# Patient Record
Sex: FEMALE | Race: WHITE | Employment: OTHER | URBAN - METROPOLITAN AREA
[De-identification: names, ages, dates, MRNs, and addresses within clinical notes are randomized per-mention and may not be internally consistent; named-entity substitution may affect disease eponyms.]

---

## 2017-03-27 ENCOUNTER — HOSPITAL ENCOUNTER (OUTPATIENT)
Dept: MAMMOGRAPHY | Age: 72
Discharge: HOME | End: 2017-03-27
Attending: FAMILY MEDICINE
Payer: MEDICARE

## 2017-03-27 DIAGNOSIS — Z12.31 VISIT FOR SCREENING MAMMOGRAM: ICD-10-CM

## 2017-03-27 PROCEDURE — 77067 SCR MAMMO BI INCL CAD: CPT

## 2018-04-25 ENCOUNTER — HOSPITAL ENCOUNTER (OUTPATIENT)
Dept: MAMMOGRAPHY | Age: 73
Discharge: HOME | End: 2018-04-25
Attending: FAMILY MEDICINE
Payer: MEDICARE

## 2018-04-25 DIAGNOSIS — Z12.39 SCREENING BREAST EXAMINATION: ICD-10-CM

## 2018-04-25 PROCEDURE — 77067 SCR MAMMO BI INCL CAD: CPT

## 2019-05-15 ENCOUNTER — HOSPITAL ENCOUNTER (OUTPATIENT)
Dept: MAMMOGRAPHY | Age: 74
Discharge: HOME | End: 2019-05-15
Attending: FAMILY MEDICINE
Payer: MEDICARE

## 2019-05-15 DIAGNOSIS — Z12.39 BREAST SCREENING, UNSPECIFIED: ICD-10-CM

## 2019-05-15 PROCEDURE — 77067 SCR MAMMO BI INCL CAD: CPT

## 2019-06-19 ENCOUNTER — HOSPITAL ENCOUNTER (OUTPATIENT)
Dept: MRI IMAGING | Age: 74
Discharge: HOME | End: 2019-06-19
Attending: PAIN MEDICINE
Payer: MEDICARE

## 2019-06-19 DIAGNOSIS — M48.062 PSEUDOCLAUDICATION SYNDROME: ICD-10-CM

## 2019-06-19 PROCEDURE — 72148 MRI LUMBAR SPINE W/O DYE: CPT

## 2019-07-30 ENCOUNTER — HOSPITAL ENCOUNTER (OUTPATIENT)
Dept: MAMMOGRAPHY | Age: 74
Discharge: HOME | End: 2019-07-30
Attending: ORTHOPAEDIC SURGERY
Payer: MEDICARE

## 2019-07-30 DIAGNOSIS — M81.8 DISUSE OSTEOPOROSIS WITHOUT PATHOLOGICAL FRACTURE: ICD-10-CM

## 2019-07-30 PROCEDURE — 77080 DXA BONE DENSITY AXIAL: CPT

## 2019-09-23 ENCOUNTER — HOSPITAL ENCOUNTER (OUTPATIENT)
Dept: PREADMISSION TESTING | Age: 74
Discharge: HOME | End: 2019-09-23
Attending: ORTHOPAEDIC SURGERY
Payer: MEDICARE

## 2019-09-23 ENCOUNTER — HOSPITAL ENCOUNTER (OUTPATIENT)
Dept: GENERAL RADIOLOGY | Age: 74
Discharge: HOME | End: 2019-09-23
Attending: ORTHOPAEDIC SURGERY
Payer: MEDICARE

## 2019-09-23 VITALS
TEMPERATURE: 98.3 F | HEART RATE: 79 BPM | DIASTOLIC BLOOD PRESSURE: 73 MMHG | SYSTOLIC BLOOD PRESSURE: 134 MMHG | WEIGHT: 199.08 LBS | RESPIRATION RATE: 20 BRPM | HEIGHT: 61 IN | BODY MASS INDEX: 37.59 KG/M2 | OXYGEN SATURATION: 100 %

## 2019-09-23 LAB
25(OH)D3 SERPL-MCNC: 24 NG/ML (ref 30–100)
ABO + RH BLD: NORMAL
ALBUMIN SERPL-MCNC: 3.9 G/DL (ref 3.5–5)
ALBUMIN/GLOB SERPL: 1.1 {RATIO} (ref 1.1–2.2)
ALP SERPL-CCNC: 84 U/L (ref 45–117)
ALT SERPL-CCNC: 22 U/L (ref 12–78)
ANION GAP SERPL CALC-SCNC: 4 MMOL/L (ref 5–15)
APPEARANCE UR: CLEAR
AST SERPL-CCNC: 14 U/L (ref 15–37)
BACTERIA URNS QL MICRO: NEGATIVE /HPF
BILIRUB SERPL-MCNC: 0.4 MG/DL (ref 0.2–1)
BILIRUB UR QL: NEGATIVE
BLOOD GROUP ANTIBODIES SERPL: NORMAL
BUN SERPL-MCNC: 22 MG/DL (ref 6–20)
BUN/CREAT SERPL: 24 (ref 12–20)
CALCIUM SERPL-MCNC: 9.4 MG/DL (ref 8.5–10.1)
CHLORIDE SERPL-SCNC: 105 MMOL/L (ref 97–108)
CO2 SERPL-SCNC: 28 MMOL/L (ref 21–32)
COLOR UR: ABNORMAL
CREAT SERPL-MCNC: 0.93 MG/DL (ref 0.55–1.02)
EPITH CASTS URNS QL MICRO: ABNORMAL /LPF
ERYTHROCYTE [DISTWIDTH] IN BLOOD BY AUTOMATED COUNT: 17.4 % (ref 11.5–14.5)
EST. AVERAGE GLUCOSE BLD GHB EST-MCNC: 148 MG/DL
GLOBULIN SER CALC-MCNC: 3.6 G/DL (ref 2–4)
GLUCOSE SERPL-MCNC: 78 MG/DL (ref 65–100)
GLUCOSE UR STRIP.AUTO-MCNC: NEGATIVE MG/DL
HBA1C MFR BLD: 6.8 % (ref 4.2–6.3)
HCT VFR BLD AUTO: 35.7 % (ref 35–47)
HGB BLD-MCNC: 11.5 G/DL (ref 11.5–16)
HGB UR QL STRIP: NEGATIVE
HYALINE CASTS URNS QL MICRO: ABNORMAL /LPF (ref 0–5)
INR PPP: 1 (ref 0.9–1.1)
KETONES UR QL STRIP.AUTO: NEGATIVE MG/DL
LEUKOCYTE ESTERASE UR QL STRIP.AUTO: ABNORMAL
MCH RBC QN AUTO: 26.1 PG (ref 26–34)
MCHC RBC AUTO-ENTMCNC: 32.2 G/DL (ref 30–36.5)
MCV RBC AUTO: 81 FL (ref 80–99)
NITRITE UR QL STRIP.AUTO: NEGATIVE
NRBC # BLD: 0 K/UL (ref 0–0.01)
NRBC BLD-RTO: 0 PER 100 WBC
PH UR STRIP: 7 [PH] (ref 5–8)
PLATELET # BLD AUTO: 425 K/UL (ref 150–400)
PMV BLD AUTO: 9.2 FL (ref 8.9–12.9)
POTASSIUM SERPL-SCNC: 3.8 MMOL/L (ref 3.5–5.1)
PREALB SERPL-MCNC: 18.2 MG/DL (ref 20–40)
PROT SERPL-MCNC: 7.5 G/DL (ref 6.4–8.2)
PROT UR STRIP-MCNC: NEGATIVE MG/DL
PROTHROMBIN TIME: 10.5 SEC (ref 9–11.1)
RBC # BLD AUTO: 4.41 M/UL (ref 3.8–5.2)
RBC #/AREA URNS HPF: ABNORMAL /HPF (ref 0–5)
SODIUM SERPL-SCNC: 137 MMOL/L (ref 136–145)
SP GR UR REFRACTOMETRY: 1.02 (ref 1–1.03)
SPECIMEN EXP DATE BLD: NORMAL
UA: UC IF INDICATED,UAUC: ABNORMAL
UROBILINOGEN UR QL STRIP.AUTO: 1 EU/DL (ref 0.2–1)
WBC # BLD AUTO: 7.6 K/UL (ref 3.6–11)
WBC URNS QL MICRO: ABNORMAL /HPF (ref 0–4)

## 2019-09-23 PROCEDURE — 97161 PT EVAL LOW COMPLEX 20 MIN: CPT

## 2019-09-23 PROCEDURE — 83036 HEMOGLOBIN GLYCOSYLATED A1C: CPT

## 2019-09-23 PROCEDURE — 85027 COMPLETE CBC AUTOMATED: CPT

## 2019-09-23 PROCEDURE — 81001 URINALYSIS AUTO W/SCOPE: CPT

## 2019-09-23 PROCEDURE — 85610 PROTHROMBIN TIME: CPT

## 2019-09-23 PROCEDURE — 36415 COLL VENOUS BLD VENIPUNCTURE: CPT

## 2019-09-23 PROCEDURE — 80053 COMPREHEN METABOLIC PANEL: CPT

## 2019-09-23 PROCEDURE — 86900 BLOOD TYPING SEROLOGIC ABO: CPT

## 2019-09-23 PROCEDURE — 97116 GAIT TRAINING THERAPY: CPT

## 2019-09-23 PROCEDURE — 84134 ASSAY OF PREALBUMIN: CPT

## 2019-09-23 PROCEDURE — 82306 VITAMIN D 25 HYDROXY: CPT

## 2019-09-23 PROCEDURE — 71046 X-RAY EXAM CHEST 2 VIEWS: CPT

## 2019-09-23 RX ORDER — ACETAMINOPHEN 10 MG/ML
1000 INJECTION, SOLUTION INTRAVENOUS ONCE
Status: CANCELLED | OUTPATIENT
Start: 2019-09-30 | End: 2019-09-30

## 2019-09-23 RX ORDER — ATORVASTATIN CALCIUM 20 MG/1
20 TABLET, FILM COATED ORAL DAILY
COMMUNITY

## 2019-09-23 RX ORDER — GABAPENTIN 300 MG/1
300 CAPSULE ORAL 3 TIMES DAILY
COMMUNITY
End: 2021-11-08

## 2019-09-23 RX ORDER — METFORMIN HYDROCHLORIDE 500 MG/1
TABLET ORAL 2 TIMES DAILY WITH MEALS
COMMUNITY

## 2019-09-23 RX ORDER — CEFAZOLIN SODIUM/WATER 2 G/20 ML
2 SYRINGE (ML) INTRAVENOUS ONCE
Status: CANCELLED | OUTPATIENT
Start: 2019-09-30 | End: 2019-09-30

## 2019-09-23 RX ORDER — LISINOPRIL AND HYDROCHLOROTHIAZIDE 10; 12.5 MG/1; MG/1
1 TABLET ORAL DAILY
COMMUNITY

## 2019-09-23 RX ORDER — OMEPRAZOLE 20 MG/1
20 TABLET, DELAYED RELEASE ORAL AS NEEDED
COMMUNITY

## 2019-09-23 RX ORDER — PREGABALIN 150 MG/1
150 CAPSULE ORAL ONCE
Status: CANCELLED | OUTPATIENT
Start: 2019-09-30 | End: 2019-09-30

## 2019-09-23 RX ORDER — CITALOPRAM 40 MG/1
40 TABLET, FILM COATED ORAL EVERY EVENING
COMMUNITY

## 2019-09-23 NOTE — PROGRESS NOTES
[facility]  Physical Therapy Pre-surgery evaluation  [address]  [place], [address] [facility]    physical Therapy pre SPINE surgery EVALUATION  Patient:Maliha KAMARA [place] (74 y.o. 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 Discussed anticipated disposition to home with possible discharge within a 1 to 2 day time frame post-surgery. Patient in agreement, partner not present for visit today but will be able to assist after surgery. Patient has not been issued a back brace. Anticipate patient will need acute PT and OT orders based on expected functional deficits following two stage surgery. GOALS: (Goals have been discussed and agreed upon with patient.)  DISCHARGE GOALS: Time Frame: 1 DAY  1. Patient will demonstrate increased strength, range of motion, and pain control via a home exercise program in order to minimize functional deficits in preparation for their upcoming surgery. This will be achieved by using education, demonstration and through the use of an informational handout including a home exercise program.  REHABILITATION POTENTIAL FOR STATED GOALS: Good     RECOMMENDATIONS AND PLANNED INTERVENTIONS: (Benefits and precautions of physical therapy have been discussed with the patient.)  1. Home Exercise Program  TREATMENT PLAN EFFECTIVE DATES: 9/23/2019 to 9/23/2019   FREQUENCY/DURATION: Patient to continue to perform home exercise program at least twice daily until surgery. SUBJECTIVE:   Patient stated my back has been bad for a long time.     OBJECTIVE DATA SUMMARY:   HISTORY:      Past Medical History:   Diagnosis Date    Chronic pain     back/steroid injections    Diabetes ([address]     GERD (gastroesophageal reflux disease)     hiatal hernia    Hypertension      Past Surgical History:   Procedure Laterality Date    HX HEENT  1995    left side Meniere's--balance nerve    HX ORTHOPAEDIC      thumb procedure (2 procedures)-biateral     HX ORTHOPAEDIC      right middle finger-hardware    HX OTHER SURGICAL      EGD/Colonoscopy (polyps)    HX NÉSTOR AND BSO      HX TONSILLECTOMY         Prior Level of Function/Home Situation: Patient is indep with ADLS and driving, uses cane in community, no recent falls. Patient lives with her partner who does work full time but will be able to take days off after surgery to assist.   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 Home Situation  Home Environment: Private residence  # Steps to Enter: 3  Rails to Enter: Yes  Hand Rails : Bilateral  One/Two Story Residence: Two story  # of Interior Steps: 15  Interior Rails: Both  Living Alone: No  Support Systems: Spouse/Significant Other/Partner  Patient Expects to be Discharged to[de-identified] Private residence  Current DME Used/Available at Home: Cane, straight  Tub or Shower Type: Tub/Shower combination           EXAMINATION/PRESENTATION/DECISION MAKING:     ADLs (Current Functional Status):    Bathing/Showering:   [x] Independent  [] Requires Assistance from Someone  [] Sponge Bath Only   Ambulation:  [x] Independent  [] Walk Indoors Only  [] Walk Outdoors  [x] Use Assistive Device  [] Use Wheelchair Only     Dressing:  [x] Independent    Requires Assistance from Someone for:  [] Sock/Shoes  [] Pants  [] Everything   Household Activities:  [] Routine house and yard work  [x] Light Housework Only  [] None       Critical Behavior:                Strength:     Strength: Generally decreased, functional                       Tone & Sensation:   Tone: Normal              Sensation: Impaired                  Range Of Motion:     AROM: Within functional limits           PROM: Within functional limits                Coordination:   Coordination: Within functional limits    Functional Mobility:  Transfers:  Sit to Stand: Independent  Stand to Sit: Independent                       Balance:   Sitting: Intact  Standing: Impaired  Standing - Static: Good  Standing - Dynamic : Fair  Ambulation/Gait Training:  Distance (ft): 150 Feet (ft)  Assistive Device: Cane, straight  Ambulation - Level of Assistance: Independent        Gait Abnormalities: Antalgic, Trunk sway increased        Base of Support: Widened        Step Length: Left shortened, Right shortened                      Functional Measure:  10 Meter walk test:  (Specify if any supplemental oxygen is used, the type, pre, during and post sats.)    Self-Selected Or Fast-Velocity: Self Selected Velocity  Trial 1 (Time to Walk 10 Meters): 20.6 Seconds  Trial 2 (Time to Walk 10 Meters): 19.3 Seconds  Trial 3 (Time to Walk 10 Meters): 16.6 Seconds  Average : 18.8 Seconds  Score (Meters/Second): 0.5 Meters/Second             Walking Speed (m/s)  Modifier Scale Age 52-63 Age 61-76 Age 66-77 Age 80-80    Male Female Male Female Male Female Male Female   CH   0% Impaired ? 1.39 ? 1.40 ? 1.36 ? 1.30 ? 1.33 ? 1.27 ? 1.21 ? 1.15   CI   1-19% Impaired 1.11-1.38 1.12-1.39 1.09-1.35 1.04-1.29 1.06-1.32 1.01-1.26 0.96-1.20 0.92-1.14   CJ   20-39% Impaired 0.83-1.10 0.84-1.11 0.82-1.08 0.78-1.03 0.80-1.05 0.76-1.00 0.72-0.95 0.69-0.91   CK   40-59% Impaired 0.56-0.82 0.57-0.83 0.54-0.81 0.52-0.77 0.53-0.79 0.51-0.75 0.48-0.71 0.46-0.68   CL   60-79% Impaired 0.28-0.55 0.28-0.56 0.27-0.53 0.26-0.51 0.27-0.52 0.25-0.50 0.24-0.49 0.23-0.45   CM   80-99% Impaired 0.01-0.28 < 0.01-0.28 < 0.01-0.27 < 0.01-0.26 0.01-0.27 0.01-0.24 0.01-0.23 0.01-0.22   CN   100% Impaired Cannot Perform   Minimal Detectable Change (MDC-90) = 0.1 m/s  Lata CONTRERAS  \"Comfortable and maximum walking speed of adults aged 20-79 years: reference values and determinants. \" Age and Agin Volume 26(1):15-9. Kenyatta Barajas. \"Age- and gender-related test performance in community-dwelling elderly people: Six-Minute Walk Test, Calzada Balance Scale, Timed Up & Go Test, and gait speeds. \" Physical Therapy: 2002 Volume 82(2):128-37. Keegan Pham DM, Supa PW, Lore Garcia JD, Jossue PALM. \"Assessing stability and change of four performance measures: a longitudinal study evaluating outcome following total hip and knee arthroplasty. \" [facility] Musculoskeletal Disorders: 2005 Volume 6(3). Mirlea Valadez, PhD; Jayden Mak, PhD. Elvira Refugio Paper: \"Walking Speed: the Sixth Vital Sign\" Journal of Geriatric Physical Therapy: 2009 - Volume 32 - Issue 2 - p 2-5 . Pain:  Pain Scale 1: Numeric (0 - 10)  Pain Intensity 1: 1  Pain Location 1: Back     Pain Description 1: Aching       Radicular pain:   Patient pain radiating into right LE to level of knee, also has paresthesia and numbness    Activity Tolerance:   Good, no SOB or fatigue noted but ambulation limited to short community distances due to pain. Patient []   does  [x]   does not demonstrate signs/symptoms of shortness of breath/dyspnea on exertion/respiratory distress. COMMUNICATION/EDUCATION:   The patient was educated on:  [x]         Early post operative mobility is imperative to achieve a patient's desired outcomes and to restore biological function. [x]         Post operative spinal precautions may/may not be applicable. These precautions are based on the patient's physician and the procedure(s) performed.     [x]         Spinal precautions including:  ·   No bending forward, sideways, or backwards  ·   No twisting   ·   No lifting more than 5-10 pounds  ·   No sitting longer than 30-60 minutes at a time  ·   Efren brace when out of bed and mobilizing    The patients plan of care was discussed as follows:   [x]         The patient verbalized understanding of his/her plan in preparation for their upcoming surgery  []         The patient's  was present for this session  []        The patient reports that he/she does not have a  identified at this time  []         The  verbalized understanding of the education regarding the patient's upcoming surgery  [x]         Patient/family agree to work toward stated goals and plan of care. []         Patient understands intent and goals of therapy, but is neutral about his/her participation. []         Patient is unable to participate in goal setting and plan of care.       Thank you for this referral.  Alton Joyce, PT    Time Calculation: 30 mins

## 2019-09-23 NOTE — PERIOP NOTES
Preventing Infections Before and After - Your Surgery    IMPORTANT INSTRUCTIONS    Please read and follow these instructions carefully. If you are unable to comply with the below instructions your procedure will be cancelled. Every Night for Three (3) nights before your surgery:  1. Shower with an antibacterial soap, such as Dial, or the soap provided at your preassessment appointment. A shower is better than a bath for cleaning your skin. 2. If needed, ask someone to help you reach all areas of your body. Dont forget to clean your belly button with every shower. The night before your surgery: If you lose your Hibiclens please contact surgery center or you can purchase it at a local pharmacy  1. On the night before your surgery, shower with an antibacterial soap, such as Dial, or the soap provided at your preassessment appointment. 2. With one packet of Hibiclens in hand, turn water off.  3. Apply Hibiclens antiseptic skin cleanser with a clean, freshly washed washcloth. ? Gently apply to your body from chin to toes (except the genital area) and especially the area(s) where your incision(s) will be. ? Leave Hibiclens on your skin for at least 20 seconds. CAUTION: If needed, Hibiclens may be used to clean the folds of skin of the legs (such as in the area of the groin) and on your buttocks and hips. However, do not use Hibiclens above the neck or in the genital area (your bottom) or put inside any area of your body. 4. Turn the water back on and rinse. 5. Dry gently with a clean, freshly washed towel. 6. After your shower, do not use any powder, deodorant, perfumes or lotion. 7. Use clean, freshly washed towels and washcloths every time you shower. 8. Wear clean, freshly washed pajamas to bed the night before surgery. 9. Sleep on clean, freshly washed sheets. 10. Do not allow pets to sleep in your bed with you. The Morning of your surgery:  1.  Shower again thoroughly with an antibacterial soap, such as Dial or the soap provided at your preassessment appointment. If needed, ask someone for help to reach all areas of your body. Dont forget to clean your belly button! Rinse. 2. Dry gently with a clean, freshly washed towel. 3. After your shower, do not use any powder, deodorant, perfumes or lotion prior to surgery. 4. Put on clean, freshly washed clothing. Tips to help prevent infections after your surgery:  1. Protect your surgical wound from germs:  ? Hand washing is the most important thing you and your caregivers can do to prevent infections. ? Keep your bandage clean and dry! ? Do not touch your surgical wound. 2. Use clean, freshly washed towels and washcloths every time you shower; do not share bath linens with others. 3. Until your surgical wound is healed, wear clothing and sleep on bed linens each day that are clean and freshly washed. 4. Do not allow pets to sleep in your bed with you or touch your surgical wound. 5. Do not smoke - smoking delays wound healing. This may be a good time to stop smoking. 6. If you have diabetes, it is important for you to manage your blood sugar levels properly before your surgery as well as after your surgery. Poorly managed blood sugar levels slow down wound healing and prevent you from healing completely. If you lose your Hibiclens, please call the [facility], or it is available for purchase at your pharmacy.                ___________________      ___________________      ________________  (Signature of Patient)          (Witness)                   (Date and Time)

## 2019-09-23 NOTE — PERIOP NOTES
Incentive Spirometer        Using the incentive spirometer helps expand the small air sacs of your lungs, helps you breathe deeply, and helps improve your lung function. Use your incentive spirometer twice a day (10 breaths each time) prior to surgery. How to Use Your Incentive Spirometer:  1. Hold the incentive spirometer in an upright position. 2. Breathe out as usual.   3. Place the mouthpiece in your mouth and seal your lips tightly around it. 4. Take a deep breath. Breathe in slowly and as deeply as possible. Keep the blue flow rate guide between the arrows. 5. Hold your breath as long as possible. Then exhale slowly and allow the piston to fall to the bottom of the column. 6. Rest for a few seconds and repeat steps one through five at least 10 times. 2  PAT Tidal Volume__________1750________  x________________  Date__09/23/19_____09/23/19________________    BRING THE INCENTIVE SPIROMETER WITH YOU TO THE HOSPITAL ON THE DAY OF YOUR SURGERY. Opportunity given to ask and answer questions as well as to observe return demonstration.     Patient signature_____________________________    Witness____________________________

## 2019-09-23 NOTE — PERIOP NOTES
[facility]  Joint/Spine Preoperative Instructions        Surgery Date 09/30/19         Time of Arrival 2:30PM  Contact # [phone] cell    1. On the day of your surgery, please report to the Surgical Services Registration Desk and sign in at your designated time. The Surgery Center is located to the right of the Emergency Room. 2. You must have someone with you to drive you home. You should not drive a car for 24 hours following surgery. Please make arrangements for a friend or family member to stay with you for the first 24 hours after your surgery. 3. No food after midnight 09/29/19. Medications morning of surgery should be taken with a sip of water. Please follow pre-surgery drink instructions that were given at your Pre Admission Testing appointment. 4. We recommend you do not drink any alcoholic beverages for 24 hours before and after your surgery. 5. Contact your surgeons office for instructions on the following medications: non-steroidal anti-inflammatory drugs (i.e. Advil, Aleve), vitamins, and supplements. (Some surgeons will want you to stop these medications prior to surgery and others may allow you to take them)  **If you are currently taking Plavix, Coumadin, Aspirin and/or other blood-thinning agents, contact your surgeon for instructions. ** Your surgeon will partner with the physician prescribing these medications to determine if it is safe to stop or if you need to continue taking. Please do not stop taking these medications without instructions from your surgeon    6. Wear comfortable clothes. Wear glasses instead of contacts. Do not bring any money or jewelry. Please bring picture ID, insurance card, and any prearranged co-payment or hospital payment. Do not wear make-up, particularly mascara the morning of your surgery. Do not wear nail polish, particularly if you are having foot /hand surgery.   Wear your hair loose or down, no ponytails, buns, radha pins or clips. All body piercings must be removed. Please shower with antibacterial soap for three consecutive days before and on the morning of surgery, but do not apply any lotions, powders or deodorants after the shower on the day of surgery. Please use a fresh towels after each shower. Please sleep in clean clothes and change bed linens the night before surgery. Please do not shave for 48 hours prior to surgery. Shaving of the face is acceptable. 7. You should understand that if you do not follow these instructions your surgery may be cancelled. If your physical condition changes (I.e. fever, cold or flu) please contact your surgeon as soon as possible. 8. It is important that you be on time. If a situation occurs where you may be late, please call [phone] (OR Holding Area). 9. If you have any questions and or problems, please call [phone] (Pre-admission Testing). 10. Your surgery time may be subject to change. You will receive a phone call the evening prior if your time changes. 11.  If having outpatient surgery, you must have someone to drive you here, stay with you during the duration of your stay, and to drive you home at time of discharge. 12.   In an effort to improve the efficiency, privacy, and safety for all of our Pre-op patients visitors are not allowed in the Holding area. Once you arrive and are registered your family/visitors will be asked to remain in the waiting room. The Pre-op staff will get you from the Surgical Waiting Area and will explain to you and your family/visitors that the Pre-op phase is beginning. The staff will answer any questions and provide instructions for tracking of the patient, by use of the existing tracking number and color-coded status board in the waiting room.   At this time the staff will also ask for your designated spokesperson information in the event that the physician or staff need to provide an update or obtain any pertinent information. The designated spokesperson will be notified if the physician needs to speak to family during the pre-operative phase. If at any time your family/visitors has questions or concerns they may approach the volunteer desk in the waiting area for assistance. Special Instructions:Practice with incentive spirometry/please bring incentive spirometry back to the hospital for use  Please bring Spine/Back book back to the hospital morning of surgery  Drink Pre-Surgery drinks provided with pre-op apppointment  MEDICATIONS TO TAKE THE MORNING OF SURGERY WITH A SIP OF WATER:  gabapentin,prilosec as needed    I understand a pre-operative phone call will be made to verify my surgery time. In the event that I am not available, I give permission for a message to be left on my answering service and/or with another person?   yes          ___________________      __________   _________    (Signature of Patient)             (Witness)                (Date and Time)

## 2019-09-23 NOTE — PERIOP NOTES
Orthopedic and Spine Patients: Instructions on When You Can    Eat or Drink Before Surgery    You were given 2 pre-surgery drinks at your pre-admission testing appointment.   Night before surgery:    o Drink one pre-surgery bottle at bedtime. o  No Food after midnight!   Day of Surgery:    o  Drink the 2nd  pre-surgery bottle 1 hour before you get to the hospital.        For questions call Pre-Admission Testing at [phone]. They are available from 8:00 am-5:00 pm, Monday through Friday.

## 2019-09-24 LAB
BACTERIA SPEC CULT: NORMAL
BACTERIA SPEC CULT: NORMAL
SERVICE CMNT-IMP: NORMAL

## 2019-09-26 NOTE — ADVANCED PRACTICE NURSE
PAT Nurse Practitioner   Pre-Operative Chart Review/Assessment:-ORTHOPEDIC/NEUROSURGICAL SPINE                Patient Name:  Sheila Elias                                                         Age:   74 y.o.    :  1945     Today's Date:  2019     Date of PAT:   19      Date of Surgery:    19 and 10/1/19     Procedure(s):   L2-L4 lateral fusion 19 and L2-L4 Posterior Decompression and fusion 10/1/19     Surgeon:   Heydi Zavala Clearance:  Dr. Raj Huddleston:      1)  Cardiac Clearance:  Not requested       2)  Diabetic Treatment Consult:  Not indicated-A1C 6.8       3)  Sleep Apnea evaluation:   Not indicated-WAN 3      4) Treatment for MRSA/Staph Aureus:  Negative       5) Additional Concerns:  Staged procedure. Chronic pain, T2DM, GERD, BMI 37                Vital Signs:         Vitals:    19 1322   BP: 134/73   Pulse: 79   Resp: 20   Temp: 98.3 °F (36.8 °C)   SpO2: 100%   Weight: 90.3 kg (199 lb 1.2 oz)   Height: 5' 1\" (1.549 m)            ____________________________________________  PAST MEDICAL HISTORY  Past Medical History:   Diagnosis Date    Chronic pain     back/steroid injections    Diabetes ([address]     GERD (gastroesophageal reflux disease)     hiatal hernia    Hypertension       ____________________________________________  PAST SURGICAL HISTORY  Past Surgical History:   Procedure Laterality Date    HX HEENT      left side Meniere's--balance nerve    HX ORTHOPAEDIC      thumb procedure (2 procedures)-biateral     HX ORTHOPAEDIC      right middle finger-hardware    HX OTHER SURGICAL      EGD/Colonoscopy (polyps)    HX NÉSTOR AND BSO      HX TONSILLECTOMY        ____________________________________________  HOME MEDICATIONS    Current Outpatient Medications   Medication Sig    metFORMIN (GLUCOPHAGE) 500 mg tablet Take  by mouth two (2) times daily (with meals).  citalopram (CELEXA) 40 mg tablet Take 40 mg by mouth every evening.     lisinopril-hydroCHLOROthiazide (PRINZIDE, ZESTORETIC) 10-12.5 mg per tablet Take 1 Tab by mouth daily.  gabapentin (NEURONTIN) 300 mg capsule Take 300 mg by mouth three (3) times daily.  atorvastatin (LIPITOR) 20 mg tablet Take 20 mg by mouth daily.  omeprazole (PRILOSEC OTC) 20 mg tablet Take 20 mg by mouth as needed. No current facility-administered medications for this encounter.       ____________________________________________  ALLERGIES  No Known Allergies   ____________________________________________  SOCIAL HISTORY  Social History     Tobacco Use    Smoking status: Never Smoker    Smokeless tobacco: Never Used   Substance Use Topics    Alcohol use: Never     Frequency: Never      ____________________________________________        Labs:     Results for Shelbi Bauer (MRN [id number]) as of 9/26/2019 12:26   Ref.  Range 9/23/2019 13:32   WBC Latest Ref Range: 3.6 - 11.0 K/uL 7.6   NRBC Latest Ref Range: 0  WBC 0.0   RBC Latest Ref Range: 3.80 - 5.20 M/uL 4.41   HGB Latest Ref Range: 11.5 - 16.0 g/dL 11.5   HCT Latest Ref Range: 35.0 - 47.0 % 35.7   MCV Latest Ref Range: 80.0 - 99.0 FL 81.0   MCH Latest Ref Range: 26.0 - 34.0 PG 26.1   MCHC Latest Ref Range: 30.0 - 36.5 g/dL 32.2   RDW Latest Ref Range: 11.5 - 14.5 % 17.4 (H)   PLATELET Latest Ref Range: 150 - 400 K/uL 425 (H)   MPV Latest Ref Range: 8.9 - 12.9 FL 9.2   ABSOLUTE NRBC Latest Ref Range: 0.00 - 0.01 K/uL 0.00   Color Latest Units:   YELLOW/STRAW   Appearance Latest Ref Range: CLEAR   CLEAR   Specific gravity Latest Ref Range: 1.003 - 1.030   1.020   pH (UA) Latest Ref Range: 5.0 - 8.0   7.0   Protein Latest Ref Range: NEG mg/dL NEGATIVE   Glucose Latest Ref Range: NEG mg/dL NEGATIVE   Ketone Latest Ref Range: NEG mg/dL NEGATIVE   Blood Latest Ref Range: NEG   NEGATIVE   Bilirubin Latest Ref Range: NEG   NEGATIVE   Urobilinogen Latest Ref Range: 0.2 - 1.0 EU/dL 1.0   Nitrites Latest Ref Range: NEG   NEGATIVE   Leukocyte Esterase Latest Ref Range: NEG   SMALL (A)   Epithelial cells Latest Ref Range: FEW /lpf FEW   WBC Latest Ref Range: 0 - 4 /hpf 0-4   RBC Latest Ref Range: 0 - 5 /hpf 0-5   Bacteria Latest Ref Range: NEG /hpf NEGATIVE   Hyaline cast Latest Ref Range: 0 - 5 /lpf 0-2   INR Latest Ref Range: 0.9 - 1.1   1.0   Prothrombin time Latest Ref Range: 9.0 - 11.1 sec 10.5   Sodium Latest Ref Range: 136 - 145 mmol/L 137   Potassium Latest Ref Range: 3.5 - 5.1 mmol/L 3.8   Chloride Latest Ref Range: 97 - 108 mmol/L 105   CO2 Latest Ref Range: 21 - 32 mmol/L 28   Anion gap Latest Ref Range: 5 - 15 mmol/L 4 (L)   Glucose Latest Ref Range: 65 - 100 mg/dL 78   BUN Latest Ref Range: 6 - 20 MG/DL 22 (H)   Creatinine Latest Ref Range: 0.55 - 1.02 MG/DL 0.93   BUN/Creatinine ratio Latest Ref Range: 12 - 20   24 (H)   Calcium Latest Ref Range: 8.5 - 10.1 MG/DL 9.4   GFR est non-AA Latest Ref Range: >60 ml/min/1.73m2 59 (L)   GFR est AA Latest Ref Range: >60 ml/min/1.73m2 >60   Bilirubin, total Latest Ref Range: 0.2 - 1.0 MG/DL 0.4   Protein, total Latest Ref Range: 6.4 - 8.2 g/dL 7.5   Albumin Latest Ref Range: 3.5 - 5.0 g/dL 3.9   Globulin Latest Ref Range: 2.0 - 4.0 g/dL 3.6   A-G Ratio Latest Ref Range: 1.1 - 2.2   1.1   ALT (SGPT) Latest Ref Range: 12 - 78 U/L 22   AST Latest Ref Range: 15 - 37 U/L 14 (L)   Alk. phosphatase Latest Ref Range: 45 - 117 U/L 84   Hemoglobin A1c, (calculated) Latest Ref Range: 4.2 - 6.3 % 6.8 (H)   Est. average glucose Latest Units: mg/dL 148   Prealbumin Latest Ref Range: 20.0 - 40.0 mg/dL 18.2 (L)   CULTURE, MRSA Unknown Rpt   Crossmatch Expiration Unknown 10/03/2019   TYPE & SCREEN Unknown Rpt       Skin:     Denies open wounds, cuts, sores, rashes or other areas of concern in PAT assessment.        Tova Sher NP     PC to pt regarding low prealbumin level and recommendations per Dr. Liriano Poster to drink [facility] Breakfast with every meal. Also advised pt to start Vitamin D 2000 units daily for low Vitamin D level. Pt states she will comply with instructions. PTA medlist updated.

## 2019-09-27 NOTE — PERIOP NOTES
Preop call made and patient given TOA. Patient instructed to drink presurgery drink one hour prior to time of arrival,  then NPO. Voice Message left.

## 2019-09-27 NOTE — PERIOP NOTES
Breanna Mccullough in Dr Jaylan Rizvi office and informed her that PCP notes states preop appointment but does not states she is cleared for surgery. Requested a lina back.

## 2019-09-30 ENCOUNTER — APPOINTMENT (OUTPATIENT)
Dept: GENERAL RADIOLOGY | Age: 74
DRG: 455 | End: 2019-09-30
Attending: ORTHOPAEDIC SURGERY
Payer: MEDICARE

## 2019-09-30 ENCOUNTER — APPOINTMENT (OUTPATIENT)
Dept: CT IMAGING | Age: 74
DRG: 455 | End: 2019-09-30
Attending: ORTHOPAEDIC SURGERY
Payer: MEDICARE

## 2019-09-30 ENCOUNTER — ANESTHESIA EVENT (OUTPATIENT)
Dept: SURGERY | Age: 74
DRG: 455 | End: 2019-09-30
Payer: MEDICARE

## 2019-09-30 ENCOUNTER — ANESTHESIA (OUTPATIENT)
Dept: SURGERY | Age: 74
DRG: 455 | End: 2019-09-30
Payer: MEDICARE

## 2019-09-30 ENCOUNTER — HOSPITAL ENCOUNTER (INPATIENT)
Age: 74
LOS: 3 days | Discharge: HOME HEALTH | DRG: 455 | End: 2019-10-03
Attending: ORTHOPAEDIC SURGERY | Admitting: ORTHOPAEDIC SURGERY
Payer: MEDICARE

## 2019-09-30 DIAGNOSIS — Z98.1 S/P LUMBAR SPINAL FUSION: Primary | ICD-10-CM

## 2019-09-30 PROBLEM — M48.062 SPINAL STENOSIS OF LUMBAR REGION WITH NEUROGENIC CLAUDICATION: Status: ACTIVE | Noted: 2019-09-30

## 2019-09-30 LAB
GLUCOSE BLD STRIP.AUTO-MCNC: 165 MG/DL (ref 65–100)
GLUCOSE BLD STRIP.AUTO-MCNC: 167 MG/DL (ref 65–100)
GLUCOSE BLD STRIP.AUTO-MCNC: 257 MG/DL (ref 65–100)
SERVICE CMNT-IMP: ABNORMAL

## 2019-09-30 PROCEDURE — 77030020704 HC DISECT ENDOSC BLNT J&J -B: Performed by: ORTHOPAEDIC SURGERY

## 2019-09-30 PROCEDURE — C1713 ANCHOR/SCREW BN/BN,TIS/BN: HCPCS | Performed by: ORTHOPAEDIC SURGERY

## 2019-09-30 PROCEDURE — 77030018846 HC SOL IRR STRL H20 ICUM -A: Performed by: ORTHOPAEDIC SURGERY

## 2019-09-30 PROCEDURE — 82962 GLUCOSE BLOOD TEST: CPT

## 2019-09-30 PROCEDURE — 77030039267 HC ADH SKN EXOFIN S2SG -B: Performed by: ORTHOPAEDIC SURGERY

## 2019-09-30 PROCEDURE — 77030033138 HC SUT PGA STRATFX J&J -B: Performed by: ORTHOPAEDIC SURGERY

## 2019-09-30 PROCEDURE — 74011250636 HC RX REV CODE- 250/636: Performed by: ORTHOPAEDIC SURGERY

## 2019-09-30 PROCEDURE — 77030003028 HC SUT VCRL J&J -A: Performed by: ORTHOPAEDIC SURGERY

## 2019-09-30 PROCEDURE — 77030025906 HC GRFT BN CUBE CNFRM MUSC -F: Performed by: ORTHOPAEDIC SURGERY

## 2019-09-30 PROCEDURE — 74011250636 HC RX REV CODE- 250/636: Performed by: ANESTHESIOLOGY

## 2019-09-30 PROCEDURE — 77030008462 HC STPLR SKN PROX J&J -A: Performed by: ORTHOPAEDIC SURGERY

## 2019-09-30 PROCEDURE — 0ST20ZZ RESECTION OF LUMBAR VERTEBRAL DISC, OPEN APPROACH: ICD-10-PCS | Performed by: ORTHOPAEDIC SURGERY

## 2019-09-30 PROCEDURE — 74011250636 HC RX REV CODE- 250/636: Performed by: NURSE ANESTHETIST, CERTIFIED REGISTERED

## 2019-09-30 PROCEDURE — 74011000250 HC RX REV CODE- 250: Performed by: NURSE ANESTHETIST, CERTIFIED REGISTERED

## 2019-09-30 PROCEDURE — 77030020782 HC GWN BAIR PAWS FLX 3M -B

## 2019-09-30 PROCEDURE — 77030005513 HC CATH URETH FOL11 MDII -B: Performed by: ORTHOPAEDIC SURGERY

## 2019-09-30 PROCEDURE — 65270000029 HC RM PRIVATE

## 2019-09-30 PROCEDURE — 0SG10AJ FUSION OF 2 OR MORE LUMBAR VERTEBRAL JOINTS WITH INTERBODY FUSION DEVICE, POSTERIOR APPROACH, ANTERIOR COLUMN, OPEN APPROACH: ICD-10-PCS | Performed by: ORTHOPAEDIC SURGERY

## 2019-09-30 PROCEDURE — 74011250636 HC RX REV CODE- 250/636

## 2019-09-30 PROCEDURE — 77030026438 HC STYL ET INTUB CARD -A: Performed by: NURSE ANESTHETIST, CERTIFIED REGISTERED

## 2019-09-30 PROCEDURE — 77030029099 HC BN WAX SSPC -A: Performed by: ORTHOPAEDIC SURGERY

## 2019-09-30 PROCEDURE — 72100 X-RAY EXAM L-S SPINE 2/3 VWS: CPT

## 2019-09-30 PROCEDURE — 77030013079 HC BLNKT BAIR HGGR 3M -A: Performed by: NURSE ANESTHETIST, CERTIFIED REGISTERED

## 2019-09-30 PROCEDURE — 76210000016 HC OR PH I REC 1 TO 1.5 HR: Performed by: ORTHOPAEDIC SURGERY

## 2019-09-30 PROCEDURE — 77030036948 HC GRFT BN FBR CORT 3DEMIN 5.0CC BACT -F: Performed by: ORTHOPAEDIC SURGERY

## 2019-09-30 PROCEDURE — 77030040356 HC CORD BPLR FRCP COVD -A: Performed by: ORTHOPAEDIC SURGERY

## 2019-09-30 PROCEDURE — 77030035129: Performed by: ORTHOPAEDIC SURGERY

## 2019-09-30 PROCEDURE — 76000 FLUOROSCOPY <1 HR PHYS/QHP: CPT

## 2019-09-30 PROCEDURE — 76060000035 HC ANESTHESIA 2 TO 2.5 HR: Performed by: ORTHOPAEDIC SURGERY

## 2019-09-30 PROCEDURE — 77030019908 HC STETH ESOPH SIMS -A: Performed by: NURSE ANESTHETIST, CERTIFIED REGISTERED

## 2019-09-30 PROCEDURE — 74011250637 HC RX REV CODE- 250/637: Performed by: ORTHOPAEDIC SURGERY

## 2019-09-30 PROCEDURE — 07DR0ZZ EXTRACTION OF ILIAC BONE MARROW, OPEN APPROACH: ICD-10-PCS | Performed by: ORTHOPAEDIC SURGERY

## 2019-09-30 PROCEDURE — 77030020268 HC MISC GENERAL SUPPLY: Performed by: ORTHOPAEDIC SURGERY

## 2019-09-30 PROCEDURE — 72131 CT LUMBAR SPINE W/O DYE: CPT

## 2019-09-30 PROCEDURE — 77030018836 HC SOL IRR NACL ICUM -A: Performed by: ORTHOPAEDIC SURGERY

## 2019-09-30 PROCEDURE — 77030008684 HC TU ET CUF COVD -B: Performed by: NURSE ANESTHETIST, CERTIFIED REGISTERED

## 2019-09-30 PROCEDURE — 76010000171 HC OR TIME 2 TO 2.5 HR INTENSV-TIER 1: Performed by: ORTHOPAEDIC SURGERY

## 2019-09-30 PROCEDURE — 00NY0ZZ RELEASE LUMBAR SPINAL CORD, OPEN APPROACH: ICD-10-PCS | Performed by: ORTHOPAEDIC SURGERY

## 2019-09-30 PROCEDURE — 77030003029 HC SUT VCRL J&J -B: Performed by: ORTHOPAEDIC SURGERY

## 2019-09-30 PROCEDURE — 77030014647 HC SEAL FBRN TISSL BAXT -D: Performed by: ORTHOPAEDIC SURGERY

## 2019-09-30 PROCEDURE — 0SG1071 FUSION OF 2 OR MORE LUMBAR VERTEBRAL JOINTS WITH AUTOLOGOUS TISSUE SUBSTITUTE, POSTERIOR APPROACH, POSTERIOR COLUMN, OPEN APPROACH: ICD-10-PCS | Performed by: ORTHOPAEDIC SURGERY

## 2019-09-30 PROCEDURE — 77030038844 HC GRFT DMB NEVOS BIOE -G1: Performed by: ORTHOPAEDIC SURGERY

## 2019-09-30 PROCEDURE — 77030034475 HC MISC IMPL SPN: Performed by: ORTHOPAEDIC SURGERY

## 2019-09-30 PROCEDURE — 77030014007 HC SPNG HEMSTAT J&J -B: Performed by: ORTHOPAEDIC SURGERY

## 2019-09-30 PROCEDURE — 77030029251 HC SPCR SPN GLMB -K1: Performed by: ORTHOPAEDIC SURGERY

## 2019-09-30 PROCEDURE — 77030040361 HC SLV COMPR DVT MDII -B: Performed by: ORTHOPAEDIC SURGERY

## 2019-09-30 DEVICE — IMPLANTABLE DEVICE: Type: IMPLANTABLE DEVICE | Site: SPINE LUMBAR | Status: FUNCTIONAL

## 2019-09-30 DEVICE — PLYMOUTH THORACOLUMBAR PLATE, L4-L5, 19MM
Type: IMPLANTABLE DEVICE | Site: SPINE LUMBAR | Status: FUNCTIONAL
Brand: PLYMOUTH

## 2019-09-30 DEVICE — PLYMOUTH THORACOLUMBAR PLATE, 19MM
Type: IMPLANTABLE DEVICE | Site: SPINE LUMBAR | Status: FUNCTIONAL
Brand: PLYMOUTH

## 2019-09-30 DEVICE — ALLOGRAFT BNE SPNG 50X20X7 MM CANC DBM: Type: IMPLANTABLE DEVICE | Site: SPINE LUMBAR | Status: FUNCTIONAL

## 2019-09-30 DEVICE — RISE-L SPACER 22 X 45MM, 7-14MM, 3-15&DEG;
Type: IMPLANTABLE DEVICE | Site: SPINE LUMBAR | Status: FUNCTIONAL
Brand: RISE-L

## 2019-09-30 DEVICE — GRAFT BNE SUB W17XH17XL17MM 49ML CANC CUBE DEMIN CNFRM: Type: IMPLANTABLE DEVICE | Site: SPINE LUMBAR | Status: FUNCTIONAL

## 2019-09-30 RX ORDER — CEFAZOLIN SODIUM/WATER 2 G/20 ML
2 SYRINGE (ML) INTRAVENOUS EVERY 8 HOURS
Status: COMPLETED | OUTPATIENT
Start: 2019-10-01 | End: 2019-10-01

## 2019-09-30 RX ORDER — GLYCOPYRROLATE 0.2 MG/ML
INJECTION INTRAMUSCULAR; INTRAVENOUS AS NEEDED
Status: DISCONTINUED | OUTPATIENT
Start: 2019-09-30 | End: 2019-09-30 | Stop reason: HOSPADM

## 2019-09-30 RX ORDER — ACETAMINOPHEN 10 MG/ML
1000 INJECTION, SOLUTION INTRAVENOUS ONCE
Status: COMPLETED | OUTPATIENT
Start: 2019-09-30 | End: 2019-09-30

## 2019-09-30 RX ORDER — OXYCODONE HYDROCHLORIDE 5 MG/1
10-15 TABLET ORAL
Status: DISCONTINUED | OUTPATIENT
Start: 2019-09-30 | End: 2019-10-03 | Stop reason: HOSPADM

## 2019-09-30 RX ORDER — AMOXICILLIN 250 MG
1 CAPSULE ORAL 2 TIMES DAILY
Status: DISCONTINUED | OUTPATIENT
Start: 2019-09-30 | End: 2019-10-03 | Stop reason: HOSPADM

## 2019-09-30 RX ORDER — NALOXONE HYDROCHLORIDE 0.4 MG/ML
0.4 INJECTION, SOLUTION INTRAMUSCULAR; INTRAVENOUS; SUBCUTANEOUS AS NEEDED
Status: DISCONTINUED | OUTPATIENT
Start: 2019-09-30 | End: 2019-10-03 | Stop reason: HOSPADM

## 2019-09-30 RX ORDER — OXYCODONE HYDROCHLORIDE 5 MG/1
5 TABLET ORAL
Status: DISCONTINUED | OUTPATIENT
Start: 2019-09-30 | End: 2019-10-03 | Stop reason: HOSPADM

## 2019-09-30 RX ORDER — SODIUM CHLORIDE 0.9 % (FLUSH) 0.9 %
5-40 SYRINGE (ML) INJECTION EVERY 8 HOURS
Status: DISCONTINUED | OUTPATIENT
Start: 2019-09-30 | End: 2019-09-30 | Stop reason: HOSPADM

## 2019-09-30 RX ORDER — SODIUM CHLORIDE 0.9 % (FLUSH) 0.9 %
5-40 SYRINGE (ML) INJECTION EVERY 8 HOURS
Status: DISCONTINUED | OUTPATIENT
Start: 2019-09-30 | End: 2019-10-03 | Stop reason: HOSPADM

## 2019-09-30 RX ORDER — GABAPENTIN 300 MG/1
300 CAPSULE ORAL 3 TIMES DAILY
Status: DISCONTINUED | OUTPATIENT
Start: 2019-09-30 | End: 2019-10-03 | Stop reason: HOSPADM

## 2019-09-30 RX ORDER — FACIAL-BODY WIPES
10 EACH TOPICAL DAILY PRN
Status: DISCONTINUED | OUTPATIENT
Start: 2019-10-02 | End: 2019-10-03 | Stop reason: HOSPADM

## 2019-09-30 RX ORDER — SODIUM CHLORIDE 0.9 % (FLUSH) 0.9 %
5-40 SYRINGE (ML) INJECTION AS NEEDED
Status: DISCONTINUED | OUTPATIENT
Start: 2019-09-30 | End: 2019-09-30 | Stop reason: HOSPADM

## 2019-09-30 RX ORDER — CEFAZOLIN SODIUM/WATER 2 G/20 ML
2 SYRINGE (ML) INTRAVENOUS ONCE
Status: COMPLETED | OUTPATIENT
Start: 2019-09-30 | End: 2019-09-30

## 2019-09-30 RX ORDER — CITALOPRAM 20 MG/1
40 TABLET, FILM COATED ORAL EVERY EVENING
Status: DISCONTINUED | OUTPATIENT
Start: 2019-10-01 | End: 2019-10-03 | Stop reason: HOSPADM

## 2019-09-30 RX ORDER — SODIUM CHLORIDE, SODIUM LACTATE, POTASSIUM CHLORIDE, CALCIUM CHLORIDE 600; 310; 30; 20 MG/100ML; MG/100ML; MG/100ML; MG/100ML
25 INJECTION, SOLUTION INTRAVENOUS CONTINUOUS
Status: DISCONTINUED | OUTPATIENT
Start: 2019-09-30 | End: 2019-09-30 | Stop reason: HOSPADM

## 2019-09-30 RX ORDER — LIDOCAINE HYDROCHLORIDE 20 MG/ML
INJECTION, SOLUTION EPIDURAL; INFILTRATION; INTRACAUDAL; PERINEURAL AS NEEDED
Status: DISCONTINUED | OUTPATIENT
Start: 2019-09-30 | End: 2019-09-30 | Stop reason: HOSPADM

## 2019-09-30 RX ORDER — DEXTROSE 50 % IN WATER (D50W) INTRAVENOUS SYRINGE
12.5-25 AS NEEDED
Status: DISCONTINUED | OUTPATIENT
Start: 2019-09-30 | End: 2019-10-03 | Stop reason: HOSPADM

## 2019-09-30 RX ORDER — INSULIN LISPRO 100 [IU]/ML
INJECTION, SOLUTION INTRAVENOUS; SUBCUTANEOUS
Status: DISCONTINUED | OUTPATIENT
Start: 2019-10-01 | End: 2019-10-03 | Stop reason: HOSPADM

## 2019-09-30 RX ORDER — LISINOPRIL 5 MG/1
10 TABLET ORAL DAILY
Status: DISCONTINUED | OUTPATIENT
Start: 2019-10-01 | End: 2019-10-03 | Stop reason: HOSPADM

## 2019-09-30 RX ORDER — ONDANSETRON 2 MG/ML
4 INJECTION INTRAMUSCULAR; INTRAVENOUS AS NEEDED
Status: DISCONTINUED | OUTPATIENT
Start: 2019-09-30 | End: 2019-09-30 | Stop reason: HOSPADM

## 2019-09-30 RX ORDER — PANTOPRAZOLE SODIUM 40 MG/1
40 TABLET, DELAYED RELEASE ORAL
Status: DISCONTINUED | OUTPATIENT
Start: 2019-10-01 | End: 2019-10-03 | Stop reason: HOSPADM

## 2019-09-30 RX ORDER — PREGABALIN 75 MG/1
150 CAPSULE ORAL ONCE
Status: COMPLETED | OUTPATIENT
Start: 2019-09-30 | End: 2019-09-30

## 2019-09-30 RX ORDER — SUCCINYLCHOLINE CHLORIDE 20 MG/ML
INJECTION INTRAMUSCULAR; INTRAVENOUS AS NEEDED
Status: DISCONTINUED | OUTPATIENT
Start: 2019-09-30 | End: 2019-09-30 | Stop reason: HOSPADM

## 2019-09-30 RX ORDER — SODIUM CHLORIDE 9 MG/ML
125 INJECTION, SOLUTION INTRAVENOUS CONTINUOUS
Status: DISPENSED | OUTPATIENT
Start: 2019-09-30 | End: 2019-10-01

## 2019-09-30 RX ORDER — NEOSTIGMINE METHYLSULFATE 1 MG/ML
INJECTION INTRAVENOUS AS NEEDED
Status: DISCONTINUED | OUTPATIENT
Start: 2019-09-30 | End: 2019-09-30 | Stop reason: HOSPADM

## 2019-09-30 RX ORDER — PROPOFOL 10 MG/ML
INJECTION, EMULSION INTRAVENOUS AS NEEDED
Status: DISCONTINUED | OUTPATIENT
Start: 2019-09-30 | End: 2019-09-30 | Stop reason: HOSPADM

## 2019-09-30 RX ORDER — MIDAZOLAM HYDROCHLORIDE 1 MG/ML
INJECTION, SOLUTION INTRAMUSCULAR; INTRAVENOUS AS NEEDED
Status: DISCONTINUED | OUTPATIENT
Start: 2019-09-30 | End: 2019-09-30 | Stop reason: HOSPADM

## 2019-09-30 RX ORDER — ONDANSETRON 2 MG/ML
INJECTION INTRAMUSCULAR; INTRAVENOUS AS NEEDED
Status: DISCONTINUED | OUTPATIENT
Start: 2019-09-30 | End: 2019-09-30 | Stop reason: HOSPADM

## 2019-09-30 RX ORDER — HYDROMORPHONE HYDROCHLORIDE 1 MG/ML
.2-.5 INJECTION, SOLUTION INTRAMUSCULAR; INTRAVENOUS; SUBCUTANEOUS
Status: DISCONTINUED | OUTPATIENT
Start: 2019-09-30 | End: 2019-09-30 | Stop reason: HOSPADM

## 2019-09-30 RX ORDER — ACETAMINOPHEN 500 MG
1000 TABLET ORAL EVERY 6 HOURS
Status: DISCONTINUED | OUTPATIENT
Start: 2019-10-01 | End: 2019-10-03 | Stop reason: HOSPADM

## 2019-09-30 RX ORDER — MAGNESIUM SULFATE 100 %
4 CRYSTALS MISCELLANEOUS AS NEEDED
Status: DISCONTINUED | OUTPATIENT
Start: 2019-09-30 | End: 2019-10-03 | Stop reason: HOSPADM

## 2019-09-30 RX ORDER — HYDROCHLOROTHIAZIDE 12.5 MG/1
12.5 CAPSULE ORAL DAILY
Status: DISCONTINUED | OUTPATIENT
Start: 2019-10-01 | End: 2019-10-03 | Stop reason: HOSPADM

## 2019-09-30 RX ORDER — POLYETHYLENE GLYCOL 3350 17 G/17G
17 POWDER, FOR SOLUTION ORAL DAILY
Status: DISCONTINUED | OUTPATIENT
Start: 2019-10-01 | End: 2019-10-03 | Stop reason: HOSPADM

## 2019-09-30 RX ORDER — FENTANYL CITRATE 50 UG/ML
INJECTION, SOLUTION INTRAMUSCULAR; INTRAVENOUS AS NEEDED
Status: DISCONTINUED | OUTPATIENT
Start: 2019-09-30 | End: 2019-09-30 | Stop reason: HOSPADM

## 2019-09-30 RX ORDER — DIAZEPAM 5 MG/1
5 TABLET ORAL
Status: DISCONTINUED | OUTPATIENT
Start: 2019-09-30 | End: 2019-10-03 | Stop reason: HOSPADM

## 2019-09-30 RX ORDER — SODIUM CHLORIDE 0.9 % (FLUSH) 0.9 %
5-40 SYRINGE (ML) INJECTION AS NEEDED
Status: DISCONTINUED | OUTPATIENT
Start: 2019-09-30 | End: 2019-10-03 | Stop reason: HOSPADM

## 2019-09-30 RX ORDER — ATORVASTATIN CALCIUM 20 MG/1
20 TABLET, FILM COATED ORAL DAILY
Status: DISCONTINUED | OUTPATIENT
Start: 2019-10-01 | End: 2019-10-03 | Stop reason: HOSPADM

## 2019-09-30 RX ORDER — CYCLOBENZAPRINE HCL 10 MG
10 TABLET ORAL
Status: DISCONTINUED | OUTPATIENT
Start: 2019-09-30 | End: 2019-10-03 | Stop reason: HOSPADM

## 2019-09-30 RX ORDER — FENTANYL CITRATE 50 UG/ML
25 INJECTION, SOLUTION INTRAMUSCULAR; INTRAVENOUS
Status: DISCONTINUED | OUTPATIENT
Start: 2019-09-30 | End: 2019-09-30 | Stop reason: HOSPADM

## 2019-09-30 RX ORDER — MORPHINE SULFATE 10 MG/ML
2 INJECTION, SOLUTION INTRAMUSCULAR; INTRAVENOUS
Status: DISCONTINUED | OUTPATIENT
Start: 2019-09-30 | End: 2019-09-30 | Stop reason: HOSPADM

## 2019-09-30 RX ORDER — ONDANSETRON 2 MG/ML
4 INJECTION INTRAMUSCULAR; INTRAVENOUS
Status: ACTIVE | OUTPATIENT
Start: 2019-09-30 | End: 2019-10-01

## 2019-09-30 RX ORDER — DEXAMETHASONE SODIUM PHOSPHATE 4 MG/ML
INJECTION, SOLUTION INTRA-ARTICULAR; INTRALESIONAL; INTRAMUSCULAR; INTRAVENOUS; SOFT TISSUE AS NEEDED
Status: DISCONTINUED | OUTPATIENT
Start: 2019-09-30 | End: 2019-09-30 | Stop reason: HOSPADM

## 2019-09-30 RX ORDER — ROCURONIUM BROMIDE 10 MG/ML
INJECTION, SOLUTION INTRAVENOUS AS NEEDED
Status: DISCONTINUED | OUTPATIENT
Start: 2019-09-30 | End: 2019-09-30 | Stop reason: HOSPADM

## 2019-09-30 RX ORDER — FAMOTIDINE 20 MG/1
20 TABLET, FILM COATED ORAL 2 TIMES DAILY
Status: DISCONTINUED | OUTPATIENT
Start: 2019-09-30 | End: 2019-09-30

## 2019-09-30 RX ORDER — DIPHENHYDRAMINE HYDROCHLORIDE 50 MG/ML
12.5 INJECTION, SOLUTION INTRAMUSCULAR; INTRAVENOUS AS NEEDED
Status: DISCONTINUED | OUTPATIENT
Start: 2019-09-30 | End: 2019-09-30 | Stop reason: HOSPADM

## 2019-09-30 RX ORDER — HYDROXYZINE HYDROCHLORIDE 10 MG/1
10 TABLET, FILM COATED ORAL
Status: DISCONTINUED | OUTPATIENT
Start: 2019-09-30 | End: 2019-10-03 | Stop reason: HOSPADM

## 2019-09-30 RX ORDER — HYDROMORPHONE HYDROCHLORIDE 2 MG/ML
INJECTION, SOLUTION INTRAMUSCULAR; INTRAVENOUS; SUBCUTANEOUS AS NEEDED
Status: DISCONTINUED | OUTPATIENT
Start: 2019-09-30 | End: 2019-09-30 | Stop reason: HOSPADM

## 2019-09-30 RX ORDER — HYDROMORPHONE HYDROCHLORIDE 1 MG/ML
1 INJECTION, SOLUTION INTRAMUSCULAR; INTRAVENOUS; SUBCUTANEOUS
Status: ACTIVE | OUTPATIENT
Start: 2019-09-30 | End: 2019-10-01

## 2019-09-30 RX ADMIN — SENNOSIDES, DOCUSATE SODIUM 1 TABLET: 50; 8.6 TABLET, FILM COATED ORAL at 22:59

## 2019-09-30 RX ADMIN — LIDOCAINE HYDROCHLORIDE 60 MG: 20 INJECTION, SOLUTION EPIDURAL; INFILTRATION; INTRACAUDAL; PERINEURAL at 17:13

## 2019-09-30 RX ADMIN — SODIUM CHLORIDE, SODIUM LACTATE, POTASSIUM CHLORIDE, AND CALCIUM CHLORIDE: 600; 310; 30; 20 INJECTION, SOLUTION INTRAVENOUS at 18:48

## 2019-09-30 RX ADMIN — SUCCINYLCHOLINE CHLORIDE 140 MG: 20 INJECTION, SOLUTION INTRAMUSCULAR; INTRAVENOUS at 17:13

## 2019-09-30 RX ADMIN — ACETAMINOPHEN 1000 MG: 10 INJECTION, SOLUTION INTRAVENOUS at 13:14

## 2019-09-30 RX ADMIN — PHENYLEPHRINE HYDROCHLORIDE 80 MCG: 10 INJECTION INTRAVENOUS at 17:25

## 2019-09-30 RX ADMIN — ROCURONIUM BROMIDE 5 MG: 10 INJECTION INTRAVENOUS at 17:13

## 2019-09-30 RX ADMIN — SODIUM CHLORIDE, SODIUM LACTATE, POTASSIUM CHLORIDE, AND CALCIUM CHLORIDE 25 ML/HR: 600; 310; 30; 20 INJECTION, SOLUTION INTRAVENOUS at 13:07

## 2019-09-30 RX ADMIN — Medication 10 ML: at 22:59

## 2019-09-30 RX ADMIN — NEOSTIGMINE METHYLSULFATE 3 MG: 1 INJECTION INTRAVENOUS at 18:58

## 2019-09-30 RX ADMIN — FENTANYL CITRATE 50 MCG: 50 INJECTION, SOLUTION INTRAMUSCULAR; INTRAVENOUS at 17:13

## 2019-09-30 RX ADMIN — FENTANYL CITRATE 25 MCG: 50 INJECTION, SOLUTION INTRAMUSCULAR; INTRAVENOUS at 20:02

## 2019-09-30 RX ADMIN — PREGABALIN 150 MG: 75 CAPSULE ORAL at 13:11

## 2019-09-30 RX ADMIN — HYDROMORPHONE HYDROCHLORIDE 0.5 MG: 2 INJECTION, SOLUTION INTRAMUSCULAR; INTRAVENOUS; SUBCUTANEOUS at 18:10

## 2019-09-30 RX ADMIN — PROPOFOL 130 MG: 10 INJECTION, EMULSION INTRAVENOUS at 17:13

## 2019-09-30 RX ADMIN — FENTANYL CITRATE 50 MCG: 50 INJECTION, SOLUTION INTRAMUSCULAR; INTRAVENOUS at 17:09

## 2019-09-30 RX ADMIN — GABAPENTIN 300 MG: 300 CAPSULE ORAL at 22:59

## 2019-09-30 RX ADMIN — HYDROMORPHONE HYDROCHLORIDE 0.5 MG: 2 INJECTION, SOLUTION INTRAMUSCULAR; INTRAVENOUS; SUBCUTANEOUS at 17:59

## 2019-09-30 RX ADMIN — ROCURONIUM BROMIDE 25 MG: 10 INJECTION INTRAVENOUS at 17:19

## 2019-09-30 RX ADMIN — ONDANSETRON HYDROCHLORIDE 4 MG: 2 INJECTION, SOLUTION INTRAMUSCULAR; INTRAVENOUS at 21:00

## 2019-09-30 RX ADMIN — GLYCOPYRROLATE 0.4 MG: 0.2 INJECTION, SOLUTION INTRAMUSCULAR; INTRAVENOUS at 18:58

## 2019-09-30 RX ADMIN — PHENYLEPHRINE HYDROCHLORIDE 80 MCG: 10 INJECTION INTRAVENOUS at 17:46

## 2019-09-30 RX ADMIN — ONDANSETRON HYDROCHLORIDE 4 MG: 2 INJECTION, SOLUTION INTRAMUSCULAR; INTRAVENOUS at 18:52

## 2019-09-30 RX ADMIN — MIDAZOLAM HYDROCHLORIDE 1 MG: 1 INJECTION INTRAMUSCULAR; INTRAVENOUS at 17:05

## 2019-09-30 RX ADMIN — DEXAMETHASONE SODIUM PHOSPHATE 4 MG: 4 INJECTION, SOLUTION INTRAMUSCULAR; INTRAVENOUS at 17:25

## 2019-09-30 RX ADMIN — Medication 2 G: at 17:20

## 2019-09-30 NOTE — PERIOP NOTES
1918-Handoff Report from Operating Room to PACU    Report received from Jessi Herrera and Catherine Alba CRNA regarding Enrique Donald.      Surgeon(s):  Veronica Hannah MD  And Procedure(s) (LRB):  L2-4 LATERAL FUSION (N/A)  confirmed   with allergies and dressings discussed. Anesthesia type, drugs, patient history, complications, estimated blood loss, vital signs, intake and output, and last pain medication, lines, reversal medications and temperature were reviewed. 2030- Family updated. 2100- TRANSFER - OUT REPORT:    Verbal report given to [facility] RN(name) on Enrique Donald  being transferred to ortho(unit) for routine post - op       Report consisted of patients Situation, Background, Assessment and   Recommendations(SBAR). Information from the following report(s) SBAR, Kardex, OR Summary, Procedure Summary, Intake/Output, MAR and Recent Results was reviewed with the receiving nurse. Opportunity for questions and clarification was provided.       Patient transported with:   O2 @ 3 liters  Registered Nurse

## 2019-09-30 NOTE — BRIEF OP NOTE
BRIEF OPERATIVE NOTE    Date of Procedure: 9/30/2019   Preoperative Diagnosis: LOW BACK [PAIN  SPINAL STENOSIS  LUMBAR SPONDYLOLSIS  Postoperative Diagnosis: SPINAL STENOSIS  LUMBAR SPONDYLOLSIS    Procedure(s):  L2-4 LATERAL FUSION  Surgeon(s) and Role:     Clementina Skinner MD - Primary         Surgical Assistant: Cassidy Fry    Surgical Staff:  Circ-1: Sari Sawant  Circ-Relief: Kristen Dexter RN  Physician Assistant: WINSTON Gruber  Radiology Technician: RT Nicole  Scrub Tech-1: Addy VILLANUEVA  Event Time In Time Out   Incision Start 1752    Incision Close       Anesthesia: Other   Estimated Blood Loss: 200cc  Specimens: * No specimens in log *   Findings: Stenosis   Complications: None  Implants:   Implant Name Type Inv.  Item Serial No.  Lot No. LRB No. Used Action   GRAFT SPNG DMB CANC [id number] -- NEVOS - [id number]  GRAFT SPNG DMB CANC [id number] -- NEVOS [id number] [employer] [id number] N/A 1 Implanted   GRAFT SPNG DMB CANC [id number] -- NEVOS - [id number]  GRAFT SPNG DMB CANC [id number] -- [place]EVOS [id number] [employer] [id number] N/A 1 Implanted   Q Pack conform cube 17mm Bone  [id number] DEPU[employer] SPINE [id number] N/A 1 Implanted   GRAFT FIBERS BNE [place] 5CC -- 3DEMIN - [id number]  GRAFT FIBERS BNE [place] 5CC -- 3DEMIN [id number] [employer] [id number] N/A 1 Implanted   Q Pack 17mm conform cube   [id number] DEPUY SPINE [id number] N/A 1 Implanted   GRAFT FIBERS BNE [place] 5CC -- 3DEMIN - [id number]  GRAFT FIBERS BNE [place] 5CC -- 3DEMIN [id number] [employer] [id number] N/A 1 Implanted   SPACER EXP [id number] 7MM 3-15D -- RISE-L - SN/A  SPACER EXP [id number] 7MM 3-15D -- RISE-L N/A [employer] N/A N/A 2 Implanted   PLATE THORLU [id number] -- [place] - SN/A Plate PLATE THORLU 20OH -- [place] N/A [facility] MEDICAL [facility] N/A N/A 1 Implanted   5.5 x 22mm screw Screw  N/A [facility] MEDICAL INC N/A N/A 4 Implanted   PLATE THORLU J7-D7 [id number] -- [place] - SN/A  PLATE THORLUM B0-P4 [id number] -- [place] N/A [employer] N/A N/A 1 Implanted

## 2019-09-30 NOTE — H&P
Progress notes     Expand All Collapse All    Subjective:      Patient ID: Rachana Mata is a 74 y.o. female.     Chief Complaint: Pain of the Lower Back        HPI:  Rachana Mata is a 74 y.o. female with complaints of low back pain radiating down the bilateral anterolateral BLE to the calves, R > L. She admits to RLE weakness and difficulty putting on shoes, entering vehicles, and climbing stairs. The pain has been present 2 years but worsened in the past year. It is described as sharp, tingling, burning, and stabbing and is there daily. It is worse with lying down, walking, and standing and better with moving and sitting. Rachana Mata has tried bilateral L4-L5 and L5-S1 ESIs with Dr. Liliana Irvin and 300 mg gabapentin TID. The pain is rated 4 out of 10 on the VAS. She ambulates with a cane.             Patient Active Problem List     Diagnosis Date Noted    Lumbar spondylosis 11/08/2018    Arthralgia of knee, right 11/08/2018    Low back pain 08/31/2017    Lumbar radiculopathy 07/13/2017    Spinal stenosis of lumbar region with neurogenic claudication 07/13/2017    Osteoarthritis of spine with radiculopathy, lumbar region 07/13/2017               Family History   Problem Relation Age of Onset    Diabetes Father           Social History           Tobacco Use    Smoking status: Never Smoker    Smokeless tobacco: Never Used   Substance Use Topics    Alcohol use:  No         Medical History        Past Medical History:   Diagnosis Date    Anxiety      Diabetes mellitus      Hyperlipidemia      Hypertension      Osteoarthritis              Surgical History   Past Surgical History:   Procedure Laterality Date    HAND SURGERY        NO RELEVANT SURGERIES                   Current Outpatient Medications:     atorvastatin (LIPITOR) 20 MG tablet, , Disp: , Rfl:     citalopram (CeleXA) 40 MG tablet, , Disp: , Rfl:     gabapentin (NEURONTIN) 300 MG capsule, Take 1 capsule (300 mg total) by mouth 3 (three) times a day, Disp: 90 capsule, Rfl: 5    lisinopril-hydrochlorothiazide (PRINZIDE,ZESTORETIC) 10-12.5 MG per tablet, , Disp: , Rfl:     metFORMIN (GLUCOPHAGE) 500 MG tablet, , Disp: , Rfl:      No Known Allergies      ROS:   No new bowel or bladder incontinence. No fever. No saddle anesthesia. Objective: There were no vitals filed for this visit.     Body mass index is 34.72 kg/m². , a BMI over 30 is considered obese and a BMI over 40 has been associated with a higher risk of surgical complications.     Constitutional: No acute distress. Well nourished. HEENT: Normocephalic. Respiratory:  No labored breathing. Cardiovascular:  No marked cyanosis. Skin:  No marked skin ulcers/lesions on bilateral upper or lower extremities. Psychiatric: Alert and oriented x3. Inspection: No gross deformity of bilateral upper or lower extremities. Musculoskeletal/Neurological:   Gait/balance:  - Wide-based and unsteady  Thoracolumbar spine:  - TTP over bilateral hip bursae  - Full range of motion.   Right lower extremity:  - No tenderness to palpation   - Full range of motion  - No pain with internal/external rotation of the hip  - Strength:  - 5 out of 5 to hip flexors  - 5 out of 5 to quads  - 5 out of 5 to TA  - 5 out of 5 to EHL  - 5 out of 5 to Gastroc/Soleus  - Negative straight leg raise  Left lower extremity:  - No tenderness to palpation   - Full range of motion  - No pain with internal/external rotation of the hip  - Strength:  - 5 out of 5 to hip flexors  - 5 out of 5 to quads  - 5 out of 5 to TA  - 5 out of 5 to EHL  - 5 out of 5 to Gastroc/Soleus  - Negative straight leg raise  Sensation:  - Intact to light touch  Reflexes:  - +2 Patellar tendon   - +2 Achilles tendon      Radiographs:     Order: XR SPINE LUMBAR 2 OR 3 VW - Indication: Low back pain, unspecified   back pain laterality, unspecified chronicity, with sciatica presence   unspecified      X-ray Lumbar Spine 2 Or 3 Views (07632)     Result Date: 7/22/2019  Shielding: N/A. Standing. AP, Flexion, Extension.      Impression: X-rays of the lumbar spine show mild degenerative changes throughout with a compression deformity at L4.      I independently reviewed the lumbar spine MRI done at [facility] in June which shows a chronic compression fracture at L4, spinal stenosis from L2-L4, foraminal stenosis at L2-L3 on the right, and a disc herniation at L3-L4 both central and to the left. Assessment:          ICD-10-CM   1. Low back pain, unspecified back pain laterality, unspecified chronicity, with sciatica presence unspecified M54.5   2. Spinal stenosis of lumbar region with neurogenic claudication M48.062   3. Lumbar spondylosis M47.816   4. Closed compression fracture of fourth lumbar vertebra, sequela S32.040S   5. 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 Pitts today. She has stenosis from L2-L4, right sided foraminal stenosis at L2-3, and a central and left sided disc herniation at L3-L4 contributing to her current symptoms. I ordered a DXA scan to check for osteoporosis prior to planning an operation and refilled the gabapentin. Follow up after the test. We are debating an L2-L4 laminectomy vs an L3-L5 posterior/lateral decompression and fusion pending the results of the DXA. Vamsi Zapata He is a candidate for L2-4 lateral and posterior fusion.    I have discussed the procedure in detail with the patient and mentioned complications, including but not limited to: death, permanent disability, heart attack, stroke, lung injury or infection, blindness, ileus, bladder or bowel problems, ureter injury, bleeding, nerve injury (including numbness, pain and weakness), paralysis (which may be permanent), failure to heal, failure to fuse bone together in fusion procedures, failure to relief symptoms, failure to relief pain, increased pain, need for further surgeries, failure or breakage or hardware, malpositioning of hardware, need to fuse or operate on additional levels determined either during or after surgery, destabilization of the spine (which may require fusion or later surgery), infections (which may or may not require additional surgery), dural tears (tears of the sac holding in nerves and spinal fluid), meningitis, voice changes, vocal cord injury, hoarseness, blood clots, pulmonary embolus, Shakir syndrome, recurrent disc herniation, diaphragm paralysis, and anesthetic complications. Comorbidities such as obesity, smoking, rheumatoid arthritis, chronic steroid use and diabetes increase these risks. The patient understands and wants to proceed.                 Orders Placed This Encounter    X-ray lumbar spine 2 or 3 views (14554)    BMI >=25 PATIENT INSTRUCTIONS & EDUCATION    gabapentin (NEURONTIN) 300 MG capsule      Return for Follow up after tests are completed.            Charting performed by Yoni Lin in the presence of Mila Curry MD.  Abril Mondragon MD, personally performed the services described in this documentation, as recorded by the scribe in my presence and it accurately and completely records my words and actions.     This note has been transcribed electronically using voice recognition and a trained scribe. It is believed to be accurate, but may contain errors secondary to technological limitations and other factors.

## 2019-09-30 NOTE — PERIOP NOTES
1314:  Dr. Eve Briones in to see the patient & discuss anesthesia plan of care    [phone]:  Patient asleep in bed with warmer on. Phoned for her significant other Ani to come to bedside. 1625:  Patient assisted OOB to BR to void.     1640:  Dr. Marques Combs in to see the patient & discuss plan of care again

## 2019-09-30 NOTE — ANESTHESIA PREPROCEDURE EVALUATION
Relevant Problems   No relevant active problems       Anesthetic History   No history of anesthetic complications            Review of Systems / Medical History  Patient summary reviewed, nursing notes reviewed and pertinent labs reviewed    Pulmonary  Within defined limits                 Neuro/Psych   Within defined limits           Cardiovascular    Hypertension              Exercise tolerance: >4 METS     GI/Hepatic/Renal     GERD      Hiatal hernia     Endo/Other    Diabetes    Obesity     Other Findings   Comments: Pre-op diagnosis:       LOW BACK (PAIN      SPINAL STENOSIS      LUMBAR SPONDYLOLSIS           Physical Exam    Airway  Mallampati: II  TM Distance: 4 - 6 cm  Neck ROM: normal range of motion   Mouth opening: Normal     Cardiovascular  Regular rate and rhythm,  S1 and S2 normal,  no murmur, click, rub, or gallop             Dental  No notable dental hx       Pulmonary  Breath sounds clear to auscultation               Abdominal  GI exam deferred       Other Findings            Anesthetic Plan    ASA: 3  Anesthesia type: general            Anesthetic plan and risks discussed with: Patient

## 2019-10-01 ENCOUNTER — APPOINTMENT (OUTPATIENT)
Dept: GENERAL RADIOLOGY | Age: 74
DRG: 455 | End: 2019-10-01
Attending: ORTHOPAEDIC SURGERY
Payer: MEDICARE

## 2019-10-01 ENCOUNTER — ANESTHESIA (OUTPATIENT)
Dept: SURGERY | Age: 74
DRG: 455 | End: 2019-10-01
Payer: MEDICARE

## 2019-10-01 PROBLEM — Z98.1 S/P LUMBAR SPINAL FUSION: Status: ACTIVE | Noted: 2019-10-01

## 2019-10-01 LAB
GLUCOSE BLD STRIP.AUTO-MCNC: 117 MG/DL (ref 65–100)
GLUCOSE BLD STRIP.AUTO-MCNC: 136 MG/DL (ref 65–100)
GLUCOSE BLD STRIP.AUTO-MCNC: 166 MG/DL (ref 65–100)
GLUCOSE BLD STRIP.AUTO-MCNC: 222 MG/DL (ref 65–100)
HGB BLD-MCNC: 9.8 G/DL (ref 11.5–16)
SERVICE CMNT-IMP: ABNORMAL

## 2019-10-01 PROCEDURE — 77030021678 HC GLIDESCP STAT DISP VERT -B: Performed by: ANESTHESIOLOGY

## 2019-10-01 PROCEDURE — 77030014647 HC SEAL FBRN TISSL BAXT -D: Performed by: ORTHOPAEDIC SURGERY

## 2019-10-01 PROCEDURE — 97165 OT EVAL LOW COMPLEX 30 MIN: CPT

## 2019-10-01 PROCEDURE — 77030008462 HC STPLR SKN PROX J&J -A: Performed by: ORTHOPAEDIC SURGERY

## 2019-10-01 PROCEDURE — 77030005513 HC CATH URETH FOL11 MDII -B: Performed by: ORTHOPAEDIC SURGERY

## 2019-10-01 PROCEDURE — 77030004391 HC BUR FLUT MEDT -C: Performed by: ORTHOPAEDIC SURGERY

## 2019-10-01 PROCEDURE — 74011000250 HC RX REV CODE- 250: Performed by: ORTHOPAEDIC SURGERY

## 2019-10-01 PROCEDURE — 74011636637 HC RX REV CODE- 636/637: Performed by: ORTHOPAEDIC SURGERY

## 2019-10-01 PROCEDURE — C1713 ANCHOR/SCREW BN/BN,TIS/BN: HCPCS | Performed by: ORTHOPAEDIC SURGERY

## 2019-10-01 PROCEDURE — 74011000250 HC RX REV CODE- 250: Performed by: NURSE ANESTHETIST, CERTIFIED REGISTERED

## 2019-10-01 PROCEDURE — 77030026438 HC STYL ET INTUB CARD -A: Performed by: ANESTHESIOLOGY

## 2019-10-01 PROCEDURE — 76210000006 HC OR PH I REC 0.5 TO 1 HR: Performed by: ORTHOPAEDIC SURGERY

## 2019-10-01 PROCEDURE — 97116 GAIT TRAINING THERAPY: CPT

## 2019-10-01 PROCEDURE — 76000 FLUOROSCOPY <1 HR PHYS/QHP: CPT

## 2019-10-01 PROCEDURE — 77030029099 HC BN WAX SSPC -A: Performed by: ORTHOPAEDIC SURGERY

## 2019-10-01 PROCEDURE — 77030012961 HC IRR KT CYSTO/TUR ICUM -A: Performed by: ORTHOPAEDIC SURGERY

## 2019-10-01 PROCEDURE — 77030018846 HC SOL IRR STRL H20 ICUM -A: Performed by: ORTHOPAEDIC SURGERY

## 2019-10-01 PROCEDURE — 77030020782 HC GWN BAIR PAWS FLX 3M -B

## 2019-10-01 PROCEDURE — 77030035129: Performed by: ORTHOPAEDIC SURGERY

## 2019-10-01 PROCEDURE — 77030020263 HC SOL INJ SOD CL0.9% LFCR 1000ML: Performed by: ORTHOPAEDIC SURGERY

## 2019-10-01 PROCEDURE — 77030040236 HC DEV DRSG WND PICO S&N -D: Performed by: ORTHOPAEDIC SURGERY

## 2019-10-01 PROCEDURE — 74011250636 HC RX REV CODE- 250/636: Performed by: NURSE ANESTHETIST, CERTIFIED REGISTERED

## 2019-10-01 PROCEDURE — 77030008684 HC TU ET CUF COVD -B: Performed by: ANESTHESIOLOGY

## 2019-10-01 PROCEDURE — 94760 N-INVAS EAR/PLS OXIMETRY 1: CPT

## 2019-10-01 PROCEDURE — 77010033678 HC OXYGEN DAILY

## 2019-10-01 PROCEDURE — 74011250636 HC RX REV CODE- 250/636

## 2019-10-01 PROCEDURE — 76060000034 HC ANESTHESIA 1.5 TO 2 HR: Performed by: ORTHOPAEDIC SURGERY

## 2019-10-01 PROCEDURE — 77030003029 HC SUT VCRL J&J -B: Performed by: ORTHOPAEDIC SURGERY

## 2019-10-01 PROCEDURE — 36415 COLL VENOUS BLD VENIPUNCTURE: CPT

## 2019-10-01 PROCEDURE — 77030022704 HC SUT VLOC COVD -B: Performed by: ORTHOPAEDIC SURGERY

## 2019-10-01 PROCEDURE — 82962 GLUCOSE BLOOD TEST: CPT

## 2019-10-01 PROCEDURE — 97161 PT EVAL LOW COMPLEX 20 MIN: CPT

## 2019-10-01 PROCEDURE — 97535 SELF CARE MNGMENT TRAINING: CPT

## 2019-10-01 PROCEDURE — 74011250636 HC RX REV CODE- 250/636: Performed by: ORTHOPAEDIC SURGERY

## 2019-10-01 PROCEDURE — 77030003028 HC SUT VCRL J&J -A: Performed by: ORTHOPAEDIC SURGERY

## 2019-10-01 PROCEDURE — 77030037696 HC ALLGRFT BN VIA FORM VIVX -I1: Performed by: ORTHOPAEDIC SURGERY

## 2019-10-01 PROCEDURE — 77030040356 HC CORD BPLR FRCP COVD -A: Performed by: ORTHOPAEDIC SURGERY

## 2019-10-01 PROCEDURE — 77030018836 HC SOL IRR NACL ICUM -A: Performed by: ORTHOPAEDIC SURGERY

## 2019-10-01 PROCEDURE — 77030018723 HC ELCTRD BLD COVD -A: Performed by: ORTHOPAEDIC SURGERY

## 2019-10-01 PROCEDURE — 65270000029 HC RM PRIVATE

## 2019-10-01 PROCEDURE — 74011250637 HC RX REV CODE- 250/637: Performed by: ORTHOPAEDIC SURGERY

## 2019-10-01 PROCEDURE — 76010000162 HC OR TIME 1.5 TO 2 HR INTENSV-TIER 1: Performed by: ORTHOPAEDIC SURGERY

## 2019-10-01 PROCEDURE — 77030040361 HC SLV COMPR DVT MDII -B: Performed by: ORTHOPAEDIC SURGERY

## 2019-10-01 PROCEDURE — 72100 X-RAY EXAM L-S SPINE 2/3 VWS: CPT

## 2019-10-01 PROCEDURE — 85018 HEMOGLOBIN: CPT

## 2019-10-01 PROCEDURE — 77030003666 HC NDL SPINAL BD -A: Performed by: ORTHOPAEDIC SURGERY

## 2019-10-01 PROCEDURE — 77030013079 HC BLNKT BAIR HGGR 3M -A: Performed by: ANESTHESIOLOGY

## 2019-10-01 DEVICE — CREO MIS MODULAR POLYAXIAL TULIP, 30MM REDUCTION
Type: IMPLANTABLE DEVICE | Site: BACK | Status: FUNCTIONAL
Brand: CREO

## 2019-10-01 DEVICE — 6.5 X 45MM CANNULATED SCREW, MODULAR, CREO AMP
Type: IMPLANTABLE DEVICE | Site: BACK | Status: FUNCTIONAL
Brand: CREO

## 2019-10-01 DEVICE — GRAFT BONE SUB 10CC DEMIN SCAFFOLD FRM MOLD VIA: Type: IMPLANTABLE DEVICE | Site: BACK | Status: FUNCTIONAL

## 2019-10-01 DEVICE — 5.0 X 45MM CANNULATED SCREW, MODULAR, CREO AMP
Type: IMPLANTABLE DEVICE | Site: BACK | Status: FUNCTIONAL
Brand: CREO

## 2019-10-01 RX ORDER — MIDAZOLAM HYDROCHLORIDE 1 MG/ML
1 INJECTION, SOLUTION INTRAMUSCULAR; INTRAVENOUS AS NEEDED
Status: DISCONTINUED | OUTPATIENT
Start: 2019-10-01 | End: 2019-10-01

## 2019-10-01 RX ORDER — FENTANYL CITRATE 50 UG/ML
INJECTION, SOLUTION INTRAMUSCULAR; INTRAVENOUS AS NEEDED
Status: DISCONTINUED | OUTPATIENT
Start: 2019-10-01 | End: 2019-10-01 | Stop reason: HOSPADM

## 2019-10-01 RX ORDER — MIDAZOLAM HYDROCHLORIDE 1 MG/ML
0.5 INJECTION, SOLUTION INTRAMUSCULAR; INTRAVENOUS
Status: DISCONTINUED | OUTPATIENT
Start: 2019-10-01 | End: 2019-10-01 | Stop reason: HOSPADM

## 2019-10-01 RX ORDER — NEOSTIGMINE METHYLSULFATE 1 MG/ML
INJECTION INTRAVENOUS AS NEEDED
Status: DISCONTINUED | OUTPATIENT
Start: 2019-10-01 | End: 2019-10-01 | Stop reason: HOSPADM

## 2019-10-01 RX ORDER — METFORMIN HYDROCHLORIDE 500 MG/1
500 TABLET ORAL 2 TIMES DAILY WITH MEALS
Status: DISCONTINUED | OUTPATIENT
Start: 2019-10-01 | End: 2019-10-03 | Stop reason: HOSPADM

## 2019-10-01 RX ORDER — LIDOCAINE HYDROCHLORIDE 10 MG/ML
0.1 INJECTION, SOLUTION EPIDURAL; INFILTRATION; INTRACAUDAL; PERINEURAL AS NEEDED
Status: DISCONTINUED | OUTPATIENT
Start: 2019-10-01 | End: 2019-10-03 | Stop reason: HOSPADM

## 2019-10-01 RX ORDER — DEXAMETHASONE SODIUM PHOSPHATE 4 MG/ML
INJECTION, SOLUTION INTRA-ARTICULAR; INTRALESIONAL; INTRAMUSCULAR; INTRAVENOUS; SOFT TISSUE AS NEEDED
Status: DISCONTINUED | OUTPATIENT
Start: 2019-10-01 | End: 2019-10-01 | Stop reason: HOSPADM

## 2019-10-01 RX ORDER — SUCCINYLCHOLINE CHLORIDE 20 MG/ML
INJECTION INTRAMUSCULAR; INTRAVENOUS AS NEEDED
Status: DISCONTINUED | OUTPATIENT
Start: 2019-10-01 | End: 2019-10-01 | Stop reason: HOSPADM

## 2019-10-01 RX ORDER — DIPHENHYDRAMINE HYDROCHLORIDE 50 MG/ML
12.5 INJECTION, SOLUTION INTRAMUSCULAR; INTRAVENOUS AS NEEDED
Status: DISCONTINUED | OUTPATIENT
Start: 2019-10-01 | End: 2019-10-01 | Stop reason: HOSPADM

## 2019-10-01 RX ORDER — HYDROCODONE BITARTRATE AND ACETAMINOPHEN 5; 325 MG/1; MG/1
1 TABLET ORAL AS NEEDED
Status: DISCONTINUED | OUTPATIENT
Start: 2019-10-01 | End: 2019-10-01 | Stop reason: HOSPADM

## 2019-10-01 RX ORDER — SODIUM CHLORIDE, SODIUM LACTATE, POTASSIUM CHLORIDE, CALCIUM CHLORIDE 600; 310; 30; 20 MG/100ML; MG/100ML; MG/100ML; MG/100ML
INJECTION, SOLUTION INTRAVENOUS
Status: DISCONTINUED | OUTPATIENT
Start: 2019-10-01 | End: 2019-10-01 | Stop reason: HOSPADM

## 2019-10-01 RX ORDER — ONDANSETRON 2 MG/ML
INJECTION INTRAMUSCULAR; INTRAVENOUS AS NEEDED
Status: DISCONTINUED | OUTPATIENT
Start: 2019-10-01 | End: 2019-10-01 | Stop reason: HOSPADM

## 2019-10-01 RX ORDER — LIDOCAINE HYDROCHLORIDE 20 MG/ML
INJECTION, SOLUTION EPIDURAL; INFILTRATION; INTRACAUDAL; PERINEURAL AS NEEDED
Status: DISCONTINUED | OUTPATIENT
Start: 2019-10-01 | End: 2019-10-01 | Stop reason: HOSPADM

## 2019-10-01 RX ORDER — FENTANYL CITRATE 50 UG/ML
50 INJECTION, SOLUTION INTRAMUSCULAR; INTRAVENOUS AS NEEDED
Status: DISCONTINUED | OUTPATIENT
Start: 2019-10-01 | End: 2019-10-03 | Stop reason: HOSPADM

## 2019-10-01 RX ORDER — ONDANSETRON 2 MG/ML
4 INJECTION INTRAMUSCULAR; INTRAVENOUS AS NEEDED
Status: DISCONTINUED | OUTPATIENT
Start: 2019-10-01 | End: 2019-10-01 | Stop reason: HOSPADM

## 2019-10-01 RX ORDER — HYDROMORPHONE HYDROCHLORIDE 2 MG/ML
INJECTION, SOLUTION INTRAMUSCULAR; INTRAVENOUS; SUBCUTANEOUS AS NEEDED
Status: DISCONTINUED | OUTPATIENT
Start: 2019-10-01 | End: 2019-10-01 | Stop reason: HOSPADM

## 2019-10-01 RX ORDER — PHENYLEPHRINE HCL IN 0.9% NACL 0.4MG/10ML
SYRINGE (ML) INTRAVENOUS AS NEEDED
Status: DISCONTINUED | OUTPATIENT
Start: 2019-10-01 | End: 2019-10-01 | Stop reason: HOSPADM

## 2019-10-01 RX ORDER — HYDROMORPHONE HYDROCHLORIDE 1 MG/ML
0.5 INJECTION, SOLUTION INTRAMUSCULAR; INTRAVENOUS; SUBCUTANEOUS
Status: DISCONTINUED | OUTPATIENT
Start: 2019-10-01 | End: 2019-10-01 | Stop reason: HOSPADM

## 2019-10-01 RX ORDER — GLYCOPYRROLATE 0.2 MG/ML
INJECTION INTRAMUSCULAR; INTRAVENOUS AS NEEDED
Status: DISCONTINUED | OUTPATIENT
Start: 2019-10-01 | End: 2019-10-01 | Stop reason: HOSPADM

## 2019-10-01 RX ORDER — ROCURONIUM BROMIDE 10 MG/ML
INJECTION, SOLUTION INTRAVENOUS AS NEEDED
Status: DISCONTINUED | OUTPATIENT
Start: 2019-10-01 | End: 2019-10-01 | Stop reason: HOSPADM

## 2019-10-01 RX ORDER — LIDOCAINE HYDROCHLORIDE AND EPINEPHRINE 10; 10 MG/ML; UG/ML
INJECTION, SOLUTION INFILTRATION; PERINEURAL AS NEEDED
Status: DISCONTINUED | OUTPATIENT
Start: 2019-10-01 | End: 2019-10-01 | Stop reason: HOSPADM

## 2019-10-01 RX ORDER — EPHEDRINE SULFATE/0.9% NACL/PF 50 MG/5 ML
SYRINGE (ML) INTRAVENOUS AS NEEDED
Status: DISCONTINUED | OUTPATIENT
Start: 2019-10-01 | End: 2019-10-01 | Stop reason: HOSPADM

## 2019-10-01 RX ORDER — FENTANYL CITRATE 50 UG/ML
25 INJECTION, SOLUTION INTRAMUSCULAR; INTRAVENOUS
Status: DISCONTINUED | OUTPATIENT
Start: 2019-10-01 | End: 2019-10-01 | Stop reason: HOSPADM

## 2019-10-01 RX ORDER — PROPOFOL 10 MG/ML
INJECTION, EMULSION INTRAVENOUS AS NEEDED
Status: DISCONTINUED | OUTPATIENT
Start: 2019-10-01 | End: 2019-10-01 | Stop reason: HOSPADM

## 2019-10-01 RX ADMIN — Medication 10 MG: at 16:11

## 2019-10-01 RX ADMIN — WATER 2 G: 1 INJECTION INTRAMUSCULAR; INTRAVENOUS; SUBCUTANEOUS at 15:19

## 2019-10-01 RX ADMIN — PROPOFOL 130 MG: 10 INJECTION, EMULSION INTRAVENOUS at 14:48

## 2019-10-01 RX ADMIN — SUCCINYLCHOLINE CHLORIDE 120 MG: 20 INJECTION, SOLUTION INTRAMUSCULAR; INTRAVENOUS at 14:48

## 2019-10-01 RX ADMIN — Medication 160 MCG: at 16:03

## 2019-10-01 RX ADMIN — Medication 2 G: at 00:30

## 2019-10-01 RX ADMIN — Medication 10 MG: at 15:31

## 2019-10-01 RX ADMIN — GLYCOPYRROLATE 0.4 MG: 0.2 INJECTION, SOLUTION INTRAMUSCULAR; INTRAVENOUS at 16:28

## 2019-10-01 RX ADMIN — ACETAMINOPHEN 1000 MG: 500 TABLET ORAL at 23:51

## 2019-10-01 RX ADMIN — FENTANYL CITRATE 25 MCG: 50 INJECTION, SOLUTION INTRAMUSCULAR; INTRAVENOUS at 14:46

## 2019-10-01 RX ADMIN — ACETAMINOPHEN 1000 MG: 500 TABLET ORAL at 07:31

## 2019-10-01 RX ADMIN — Medication 160 MCG: at 15:21

## 2019-10-01 RX ADMIN — HYDROMORPHONE HYDROCHLORIDE 0.4 MG: 2 INJECTION, SOLUTION INTRAMUSCULAR; INTRAVENOUS; SUBCUTANEOUS at 15:51

## 2019-10-01 RX ADMIN — Medication 80 MCG: at 14:57

## 2019-10-01 RX ADMIN — PANTOPRAZOLE SODIUM 40 MG: 40 TABLET, DELAYED RELEASE ORAL at 07:31

## 2019-10-01 RX ADMIN — LIDOCAINE HYDROCHLORIDE 100 MG: 20 INJECTION, SOLUTION INTRAVENOUS at 14:48

## 2019-10-01 RX ADMIN — ATORVASTATIN CALCIUM 20 MG: 20 TABLET, FILM COATED ORAL at 08:40

## 2019-10-01 RX ADMIN — ROCURONIUM BROMIDE 50 MG: 10 INJECTION INTRAVENOUS at 14:52

## 2019-10-01 RX ADMIN — INSULIN LISPRO 4 UNITS: 100 INJECTION, SOLUTION INTRAVENOUS; SUBCUTANEOUS at 08:40

## 2019-10-01 RX ADMIN — Medication 80 MCG: at 15:37

## 2019-10-01 RX ADMIN — ACETAMINOPHEN 1000 MG: 500 TABLET ORAL at 11:45

## 2019-10-01 RX ADMIN — GABAPENTIN 300 MG: 300 CAPSULE ORAL at 22:20

## 2019-10-01 RX ADMIN — OXYCODONE HYDROCHLORIDE 10 MG: 5 TABLET ORAL at 04:48

## 2019-10-01 RX ADMIN — OXYCODONE HYDROCHLORIDE 10 MG: 5 TABLET ORAL at 08:39

## 2019-10-01 RX ADMIN — Medication 160 MCG: at 14:48

## 2019-10-01 RX ADMIN — ONDANSETRON HYDROCHLORIDE 4 MG: 2 INJECTION, SOLUTION INTRAMUSCULAR; INTRAVENOUS at 16:08

## 2019-10-01 RX ADMIN — Medication 2 G: at 08:40

## 2019-10-01 RX ADMIN — Medication 10 ML: at 22:20

## 2019-10-01 RX ADMIN — Medication 80 MCG: at 15:54

## 2019-10-01 RX ADMIN — GABAPENTIN 300 MG: 300 CAPSULE ORAL at 08:39

## 2019-10-01 RX ADMIN — FENTANYL CITRATE 75 MCG: 50 INJECTION, SOLUTION INTRAMUSCULAR; INTRAVENOUS at 14:48

## 2019-10-01 RX ADMIN — ACETAMINOPHEN 1000 MG: 500 TABLET ORAL at 00:44

## 2019-10-01 RX ADMIN — NEOSTIGMINE METHYLSULFATE 4 MG: 1 INJECTION INTRAVENOUS at 16:28

## 2019-10-01 RX ADMIN — SODIUM CHLORIDE 125 ML/HR: 900 INJECTION, SOLUTION INTRAVENOUS at 11:54

## 2019-10-01 RX ADMIN — SODIUM CHLORIDE, POTASSIUM CHLORIDE, SODIUM LACTATE AND CALCIUM CHLORIDE: 600; 310; 30; 20 INJECTION, SOLUTION INTRAVENOUS at 14:46

## 2019-10-01 RX ADMIN — SENNOSIDES, DOCUSATE SODIUM 1 TABLET: 50; 8.6 TABLET, FILM COATED ORAL at 08:39

## 2019-10-01 RX ADMIN — DEXAMETHASONE SODIUM PHOSPHATE 8 MG: 4 INJECTION, SOLUTION INTRAMUSCULAR; INTRAVENOUS at 15:29

## 2019-10-01 RX ADMIN — Medication 10 ML: at 07:31

## 2019-10-01 RX ADMIN — Medication 10 ML: at 18:14

## 2019-10-01 NOTE — ANESTHESIA POSTPROCEDURE EVALUATION
Procedure(s):  L2-4 POSTERIOR DECOMPRESSION AND FUSION. general    Anesthesia Post Evaluation        Patient location during evaluation: PACU  Note status: Adequate. Level of consciousness: responsive to verbal stimuli and sleepy but conscious  Pain management: satisfactory to patient  Airway patency: patent  Anesthetic complications: no  Cardiovascular status: acceptable  Respiratory status: acceptable  Hydration status: acceptable  Comments: +Post-Anesthesia Evaluation and Assessment    Patient: Car Earl MRN: [id number]  SSN: [id number]   YOB: 1945  Age: 74 y.o. Sex: female      Cardiovascular Function/Vital Signs    /68 (BP 1 Location: Right arm, BP Patient Position: At rest)   Pulse (!) 103   Temp 36.7 °C (98 °F)   Resp 12   Ht 5' 1\" (1.549 m)   Wt 88.4 kg (194 lb 14.2 oz)   SpO2 94%   Breastfeeding? No   BMI 36.82 kg/m²     Patient is status post Procedure(s):  L2-4 POSTERIOR DECOMPRESSION AND FUSION. Nausea/Vomiting: Controlled. Postoperative hydration reviewed and adequate. Pain:  Pain Scale 1: FLACC (10/01/19 1715)  Pain Intensity 1: 0 (10/01/19 1715)   Managed. Neurological Status:   Neuro (WDL): Exceptions to WDL (10/01/19 1645)   At baseline. Mental Status and Level of Consciousness: Arousable. Pulmonary Status:   O2 Device: Nasal cannula (10/01/19 1715)   Adequate oxygenation and airway patent. Complications related to anesthesia: None    Post-anesthesia assessment completed. No concerns. Signed By: David Moralez MD    10/1/2019  Post anesthesia nausea and vomiting:  controlled      Vitals Value Taken Time   /59 10/1/2019  5:17 PM   Temp 36.7 °C (98 °F) 10/1/2019  4:48 PM   Pulse 103 10/1/2019  5:18 PM   Resp 11 10/1/2019  5:18 PM   SpO2 94 % 10/1/2019  5:18 PM   Vitals shown include unvalidated device data.

## 2019-10-01 NOTE — OP NOTES
[address]  OPERATIVE REPORT    Name:  Alina Wilson  MR#:  [id number]  :  1945  ACCOUNT #:  [de-identified]  DATE OF SERVICE:  2019      PREOPERATIVE DIAGNOSES:  1. Spinal stenosis with claudication. 2.  Lumbago. 3.  Sciatica. 4.  Scoliosis. 5.  Foraminal stenosis. POSTOPERATIVE DIAGNOSES:  1. Spinal stenosis with claudication. 2.  Lumbago. 3.  Sciatica. 4.  Scoliosis. 5.  Foraminal stenosis. PROCEDURE PERFORMED:  1. L2 through L4 anterior fusion. 2.  L2  through L4 anterior instrumentation. 3.  L2 through L4 insertion of interbody biomechanical device x2.  4.  Use of allograft bone for spine fusion. 5.  Bone marrow aspirate from the left posterior superior iliac spine for spinal fusion augmentation. SURGEON:  Yuly Tate MD.    FIRST ASSISTANT:  Lisa Gunn PA-C. ANESTHESIA:  General.    COMPLICATIONS:  None. SPECIMENS REMOVED:  None. IMPLANTS:  Globus RISE-L interbody biomechanical device and a Globus anterior plate. ESTIMATED BLOOD LOSS:  200 mL. DRAINS:  None. INDICATIONS OF PROCEDURE:  The patient is a pleasant 78-year-old lady with lumbar spinal stenosis, lumbago, sciatica, and scoliosis, and has failed to improve with nonoperative treatment. At this point, she would like to proceed with operative intervention. I have given her warnings about the possible complications including but not limited to pain, scar, bleeding, infection, nonunion, damage to surrounding structures, death, paralysis, blindness, or stroke. she understands and wants to proceed. NARRATIVE OF THE PROCEDURE:  After informed consent was obtained and the operative site was properly marked, the patient was moved back to the operating room and underwent general endotracheal anesthesia. She was positioned on the lateral decubitus on the [employer] table flat top. Her arms were well padded and tucked at the side and knees were gently bent with pillows. Fluoroscopy was used to bright the level of the incision after safely securing her to the bed and padding all the bony extremities. We then proceeded to obtain a time-out verifying that this was the correct patient, the correct surgery, the correct site as well as that she had received IV antibiotics within 30 minutes of the incision, in this case she received 2 g of IV Ancef. I then proceeded to perform a standard anterior approach to the lumbar spine, exposing the area between L2 and L4. We proceeded to split the abdominal musculature in layers, and entered the retroperitoneal space. I was able then to retract the peritoneal contents anteriorly and the psoas muscles posteriorly, exposing the disk space at L2-L3 and L3-L4. I placed my self-retaining retractor at L2-L3 and proceeded then to perform a box annulotomy. The annulus was removed with a pituitary and I proceeded then detach the superior and inferior cartilaginous endplates with a Rocha. I then proceeded to use a box shaver to remove the disk and cartilaginous endplate, leaving behind punctate bleeding bone. Pituitary was used to finish cleaning up the job and then a trial was inserted to determine the size of the implant, this was done at L2-L3. While this was done, I inserted the Jamshidi needle through a stab incision over the left posterior superior iliac spine and aspirated 60 mL of bone marrow for spinal fusion augmentation. I proceeded to use that to augment the bone graft, that was placed into the interbody biomechanical device and while the interbody biomechanical device was being prepared, I proceeded to release the ALL with a 15 blade. Once the final cage was inserted, I proceeded to deploy it to its final height and performed an ALL release and restoring lumbar lordosis at that site. I was able to notice that the osteophytes on the far end of the cage had been fractured; however, restoration of the lumbar height was maintained.   I backfilled the cage with the funnel with bone graft, followed by placing a plate between L2 and L3 and drilling for a screw at L2 and another one at L3. The screws were final tightened to the plate and locked. Once this was completed, the retractor was moved down and the procedure was repeated in the exact same manner at the L3-L4 level. Once the cage, the bone graft and the plates were placed at that level at L3-L4, I proceeded to notice that the anterior osteophyte was fragmented at that level. I obtained final AP and lateral x-rays and saved them to PACS. I then proceeded to close the abdominal musculature in layers with #1 Vicryl figure-of-eight interrupted sutures, followed by irrigating the subcu, closed the subcu with 2-0 Vicryl, and the skin with the 3-0 running Monocryl and Dermabond. Sterile dressing was applied. The patient was then awakened and transferred to PACU in stable condition. POSTOPERATIVE PLAN:  The patient is going to remain here overnight. We are going to give her SCDs and ABDULAZIZ maldonadoes for DVT prophylaxis and Ancef for infection prophylaxis. She will return tomorrow for the second part of the procedure as previously scheduled. Nany Hyman MD      [id number]  D:  09/30/2019 19:13  T:  10/01/2019 3:20  JOB #:  [id number]  CC:  [id number] [facility] [address] Darrin Chnug

## 2019-10-01 NOTE — PROGRESS NOTES
Orthopedic End of Shift Note    Bedside shift change report given to Jackie (oncoming nurse) by Sofiya Apodaca (offgoing nurse). Report included the following information SBAR, Kardex, ED Summary, OR Summary, Intake/Output, MAR, Accordion, Recent Results and Med Rec Status.      POD# 1  Significant issues during shift:     Issues for Physician to address    Activity This Shift  (check all that apply) [] chair  [] dangle   [x] bathroom  [] bedside commode [] hallway  [] bedrest   Nausea/Vomiting [] yes [x] no     Voiding Status [x] void [] White [] I&O Cath   Bowel Movements [] yes [x] no     Foot Pumps or SCD [x] yes [] no    Ice Pack [] yes    [] no    Incentive Spirometer [] yes [] no Volume:     Telemetry Monitoring   [] yes [] no Rhythm:   Supplemental O2 [] yes [] no Sat off O2:   96%

## 2019-10-01 NOTE — PERIOP NOTES
Handoff Report from Operating Room to PACU    Report received from Sue Price and Cass Carney RN regarding Holland Henley.      Surgeon(s):  Brenda Shah MD  And Procedure(s) (LRB):  L2-4 POSTERIOR DECOMPRESSION AND FUSION (N/A)  confirmed   with allergies and dressings discussed. Anesthesia type, drugs, patient history, complications, estimated blood loss, vital signs, intake and output, and last pain medication, lines and temperature were reviewed. 1740: TRANSFER - OUT REPORT:    Verbal report given to Blanca Sims (name) on Holland Henley  being transferred to Ortho (unit) for routine post - op       Report consisted of patients Situation, Background, Assessment and   Recommendations(SBAR). Information from the following report(s) SBAR, Kardex, Intake/Output, MAR and Med Rec Status was reviewed with the receiving nurse. Lines:   Peripheral IV 09/30/19 Left Hand (Active)   Site Assessment Clean, dry, & intact 10/1/2019  5:30 PM   Phlebitis Assessment 0 10/1/2019  5:30 PM   Infiltration Assessment 0 10/1/2019  5:30 PM   Dressing Status Clean, dry, & intact 10/1/2019  5:30 PM   Dressing Type Transparent;Tape 10/1/2019  5:30 PM   Hub Color/Line Status Pink 10/1/2019  5:30 PM        Opportunity for questions and clarification was provided.       Patient transported with:   O2 @ 3 liters  Tech

## 2019-10-01 NOTE — PERIOP NOTES
TRANSFER - IN REPORT:    Verbal report received from [facility], RN(name) on [place]  being received from Ortho(unit) for ordered procedure      Report consisted of patients Situation, Background, Assessment and   Recommendations(SBAR). Information from the following report(s) SBAR, Kardex, Intake/Output, MAR and Recent Results was reviewed with the receiving nurse. Opportunity for questions and clarification was provided. Assessment completed upon patients arrival to unit and care assumed.

## 2019-10-01 NOTE — PROGRESS NOTES
Problem: Mobility Impaired (Adult and Pediatric)  Goal: *Acute Goals and Plan of Care (Insert Text)  Description  FUNCTIONAL STATUS PRIOR TO ADMISSION: Patient was modified independent using a Single point cane for functional mobility. HOME SUPPORT PRIOR TO ADMISSION: The patient lived with her spouse who works during the day. Physical Therapy Goals  Initiated 10/1/2019    1. Patient will move from supine to sit and sit to supine  in bed with modified independence within 4 days. 2. Patient will perform sit to stand with modified independence within 4 days. 3. Patient will ambulate with modified independence for 200 feet with the least restrictive device within 4 days. 4. Patient will ascend/descend 4 stairs with 1 handrail(s) with modified independence within 4 days. 5. Patient will verbalize and demonstrate understanding of spinal precautions (No bending, lifting greater than 5 lbs, or twisting; log-roll technique; frequent repositioning as instructed) within 4 days. Outcome: Progressing Towards Goal   PHYSICAL THERAPY EVALUATION  Patient: Ary Nogueira (71 y.o. female)  Date: 10/1/2019  Primary Diagnosis: Spinal stenosis of lumbar region with neurogenic claudication [M48.062]  Procedure(s) (LRB):  L2-4 POSTERIOR DECOMPRESSION AND FUSION (ANES GENERAL/CHOICE) (surg pain sol) (N/A) Day of Surgery   Precautions:   Back      ASSESSMENT  Based on the objective data described below, the patient presents with anticipated back pain, impaired ROM, decreased balance, and mild left LE weakness following admission for L2-4 decompression and fusion. Pain was fair during evaluation and improved with activity. Noted left LE weakness in the quad and with ankle dorsiflexion. She reports buckling of the LLE yesterday pm but no buckling this am. No symptoms of orthostatic hypotension but pressures labile with a 30mmHg change between supine and standing.    Discharge recommendations TBD based on progress following part 2 of her surgery. Current Level of Function Impacting Discharge (mobility/balance): sit to stand mod x1-2       Patient will benefit from skilled therapy intervention to address the above noted impairments. PLAN :  Recommendations and Planned Interventions: bed mobility training, transfer training, gait training and therapeutic exercises      Frequency/Duration: Patient will be followed by physical therapy:  twice daily to address goals. Recommendation for discharge: (in order for the patient to meet his/her long term goals)  Physical therapy at least 2 days/week in the home     This discharge recommendation:  Has not yet been discussed the attending provider and/or case management    Equipment recommendations for successful discharge (if) home: to be determined           SUBJECTIVE:   Patient stated I feel woozy.     OBJECTIVE DATA SUMMARY:   HISTORY:    Past Medical History:   Diagnosis Date    Chronic pain     back/steroid injections    Diabetes ([address]     GERD (gastroesophageal reflux disease)     hiatal hernia    Hypertension     Psychiatric disorder     anxiety/depression     Past Surgical History:   Procedure Laterality Date    HX HEENT  1995    left side Meniere's--balance nerve    HX ORTHOPAEDIC      thumb procedure (2 procedures)-biateral     HX ORTHOPAEDIC      right middle finger-hardware    HX OTHER SURGICAL      EGD/Colonoscopy (polyps)    HX NÉSTOR AND BSO      HX TONSILLECTOMY         Personal factors and/or comorbidities impacting plan of care: hx of Meniere's disease    Home Situation  Home Environment: Private residence  # Steps to Enter: 3  Rails to Enter: Yes  Hand Rails : Bilateral  One/Two Story Residence: Two story  # of Interior Steps: 15  Interior Rails: Both  Living Alone: No  Support Systems: Spouse/Significant Other/Partner  Patient Expects to be Discharged to[de-identified] Private residence  Current DME Used/Available at Home: Cane, straight  Tub or Shower Type: Tub/Shower combination    EXAMINATION/PRESENTATION/DECISION MAKING:   Critical Behavior:  Neurologic State: Alert  Orientation Level: Oriented X4  Cognition: Follows commands     Hearing: Auditory  Auditory Impairment: Deaf, Hard of hearing, right side  Hearing Aids/Status: Does not own  Skin:    Edema:   Range Of Motion:  AROM: Within functional limits           PROM: Within functional limits           Strength:    Strength: Generally decreased, functional(left quad 3+/5, ankle dorsiflexion 3+/5)                    Tone & Sensation:   Tone: Normal              Sensation: Intact               Coordination:  Coordination: Within functional limits  Vision:      Functional Mobility:  Bed Mobility:  Rolling: Moderate assistance;Minimum assistance;Assist x2  Supine to Sit: Moderate assistance; Additional time     Scooting: Moderate assistance; Additional time  Transfers:  Sit to Stand: Moderate assistance;Assist x1;Assist x2                          Balance:   Sitting: Impaired  Sitting - Static: Fair (occasional)  Sitting - Dynamic: Fair (occasional)  Standing: Impaired  Standing - Static: Good  Standing - Dynamic : Fair  Ambulation/Gait Training:  Distance (ft): 8 Feet (ft)  Assistive Device: Gait belt;Walker, rolling;Brace/Splint  Ambulation - Level of Assistance:  Moderate assistance        Gait Abnormalities: Antalgic;Decreased step clearance        Base of Support: Widened        Step Length: Left shortened;Right shortened                            Physical Therapy Evaluation Charge Determination   History Examination Presentation Decision-Making   MEDIUM  Complexity : 1-2 comorbidities / personal factors will impact the outcome/ POC  MEDIUM Complexity : 3 Standardized tests and measures addressing body structure, function, activity limitation and / or participation in recreation  LOW Complexity : Stable, uncomplicated  LOW Complexity : FOTO score of       Based on the above components, the patient evaluation is determined to be of the following complexity level: LOW     Pain Ratin/10    Activity Tolerance:   Fair  Please refer to the flowsheet for vital signs taken during this treatment. After treatment patient left in no apparent distress:   Sitting in chair and Call bell within reach    COMMUNICATION/EDUCATION:   The patients plan of care was discussed with: Registered Nurse. Fall prevention education was provided and the patient/caregiver indicated understanding. and Patient/family have participated as able in goal setting and plan of care.     Thank you for this referral.  Rashid White, PT, DPT   Time Calculation: 42 mins

## 2019-10-01 NOTE — BRIEF OP NOTE
BRIEF OPERATIVE NOTE    Date of Procedure: 10/1/2019   Preoperative Diagnosis: LOW BACK PAIN  SPINAL STENOSIS OF LUMBAR  LUMBAR SPONDYLOLSIS  Postoperative Diagnosis: LOW BACK PAIN  SPINAL STENOSIS OF LUMBAR    Procedure(s):  L2-4 POSTERIOR DECOMPRESSION AND FUSION  Surgeon(s) and Role:     Nenita Stubbs MD - Primary         Surgical Assistant: Doreen De Luna    Surgical Staff:  Circ-1: Lawyer Liu RN  Physician Assistant: WINSTON Alcaraz  Scrub RN-1: Chencho Rivas  Event Time In Time Out   Incision Start 1519    Incision Close       Anesthesia: General   Estimated Blood Loss: 250cc  Specimens: * No specimens in log *   Findings: stenosis   Complications: None  Implants:   Implant Name Type Inv.  Item Serial No.  Lot No. LRB No. Used Action   GRAFT BNE VIABLE 10CC MOLD -- VIA FORM MOLDABLE - [id number]  GRAFT BNE VIABLE 10CC MOLD -- VIA FORM MOLDABLE [id number] [employer] NA N/A 1 Implanted   SCR STEVEN MOD 5.0X45MM -- CREO - SNA  SCR STEVEN MOD 5.0X45MM -- CREO NA [employer] NA N/A 4 Implanted   SCR STEVEN MOD 6.5X45MM -- CREO - SNA  SCR STEVEN MOD 6.5X45MM -- CREO NA [employer] NA N/A 2 Implanted   TULIP POLYAXL MOD 30MM REDUC -- CREO MIS - SNA  TULIP POLYAXL MOD 30MM REDUC -- CREO MIS NA [employer] NA N/A 6 Implanted   80MM LILLIANA   NA [employer] NA N/A 1 Implanted   LOCKING CAP   NA [employer] NA N/A 6 Implanted   90MM LILLIANA X1   NA [employer] NA N/A 1 Implanted

## 2019-10-01 NOTE — PROGRESS NOTES
Problem: Falls - Risk of  Goal: *Absence of Falls  Description  Document Kervin Host Fall Risk and appropriate interventions in the flowsheet.   Outcome: Progressing Towards Goal  Note:   Fall Risk Interventions:  Mobility Interventions: Assess mobility with egress test         Medication Interventions: Assess postural VS orthostatic hypotension

## 2019-10-01 NOTE — PROGRESS NOTES
Bedside shift change report given to [facility], RN (oncoming nurse) by Xiomara Subramanian RN (offgoing nurse). Report included the following information SBAR, Kardex, Procedure Summary, Intake/Output, MAR, Accordion, Recent Results and Med Rec Status.

## 2019-10-01 NOTE — DISCHARGE INSTRUCTIONS
[address] [place] [address]    Discharge Instruction Sheet: POSTERIOR SPINAL FUSION    DR. Jennifer Stallings    Pain control:   Typically, we will prescribe a narcotic usually 1-2 tabs every four hours is    sufficient for the pain. Most patients need this only for the first few weeks. You   should discontinue this as the pain decreases. You should not drive while taking any narcotic pain medications. Constipation  Pain medicines and anesthesia can be constipating-this can be prevented by gentle physical activity and drinking plenty of fluid. It should be treated with over-the-counter medications such as Miralax or suppositories, and/or Fleets enema. You should have a bowel movement at least every other day following surgery. Incision care     Keep this area clean and dry. Your dressing is designed to stay in place for 5-7 days. You will be sent home with one additional dressing to change at that time. Leave this new dressing in place until our follow up visit in the office in about 10-14 days. If staples are in place, they should be removed about 14-20 days after surgery. You may shower with this impermeable dressing in place. DO NOT take a tub bath or go swimming until cleared by your doctor. DO NOT apply lotions, oils, or creams to incision. To increase and promote healing:   Stop Smoking (or at least cut back on smoking).  Eat a well-balanced diet (high in protein and vitamin C)   If your appetite is poor, consider nutritional supplements like Ensure, Glucerna, or [place] Instant Breakfast.   If you are diabetic, controlling you blood sugars is very important to prevent infection and promote wound healing. Nutrition:   If you were on a supplement such as Ensure or Glucerna) while in the hospital, please continue using them with each meal for the next 30 days.    Eat a well-balanced diet - High in protein, high in vitamins and minerals, especially vitamin C and zinc.     Restrictions:   Remember your \"BLT's\"    1. Limited bending at waist    2. Lift no more than 10 pounds    3. No Twisting     If you were given a brace, wear it when out of bed. Warning signs : Please call your physician immediately at [phone] if you have   Bleeding from incision that is constant.  Change in mental status (unusual behavior or confusion)   If your incision develops redness or swelling   Change in wound drainage (increase in amount, color, or foul odor)   [place] over 101.5 degrees Fahrenheit    Headache that is not relieved with pain medication   Tenderness or redness in the calf of your leg    Emergency: CALL 911 if you have   Shortness of breath   Chest pain   Localized chest pain when coughing or taking a deep breath    Follow-up  Please call Dr. Irma Loera office for a follow up appointment in 2 weeks at [phone]. You can return to work when cleared by a physician. During normal business hours you may reach Dr. Brett Coronel' team directly at [phone] if you have concerns or questions.     Gus Hernández

## 2019-10-01 NOTE — ANESTHESIA POSTPROCEDURE EVALUATION
Procedure(s):  L2-4 LATERAL FUSION. general    Anesthesia Post Evaluation        Patient location during evaluation: PACU  Note status: Adequate. Level of consciousness: responsive to verbal stimuli and sleepy but conscious  Pain management: satisfactory to patient  Airway patency: patent  Anesthetic complications: no  Cardiovascular status: acceptable  Respiratory status: acceptable  Hydration status: acceptable  Comments: +Post-Anesthesia Evaluation and Assessment    Patient: Pinky Erazo MRN: [id number]  SSN: [id number]   YOB: 1945  Age: 74 y.o. Sex: female      Cardiovascular Function/Vital Signs    /66   Pulse 84   Temp 36.7 °C (98.1 °F)   Resp 11   Ht 5' 1\" (1.549 m)   Wt 88.4 kg (194 lb 14.2 oz)   SpO2 92%   BMI 36.82 kg/m²     Patient is status post Procedure(s):  L2-4 LATERAL FUSION. Nausea/Vomiting: Controlled. Postoperative hydration reviewed and adequate. Pain:  Pain Scale 1: FLACC (09/30/19 2015)  Pain Intensity 1: 0 (09/30/19 2015)   Managed. Neurological Status:   Neuro (WDL): Exceptions to WDL (09/30/19 1925)   At baseline. Mental Status and Level of Consciousness: Arousable. Pulmonary Status:   O2 Device: Nasal cannula (09/30/19 2015)   Adequate oxygenation and airway patent. Complications related to anesthesia: None    Post-anesthesia assessment completed. No concerns. Signed By: Preethi Mcclure MD    9/30/2019  Post anesthesia nausea and vomiting:  controlled      Vitals Value Taken Time   /66 9/30/2019  8:15 PM   Temp 36.7 °C (98.1 °F) 9/30/2019  7:21 PM   Pulse 86 9/30/2019  8:24 PM   Resp 11 9/30/2019  8:24 PM   SpO2 93 % 9/30/2019  8:24 PM   Vitals shown include unvalidated device data.

## 2019-10-01 NOTE — PROGRESS NOTES
Ortho / Neurosurgery NP Note    POD# 1  s/p L2-4 LATERAL FUSION   Pt seen with family at bedside. Pt seen again with Dr. Keshia Lawton. Pt resting in bed. No complaints. Pain controlled. Oxycodone may be too strong for her. VSS Afebrile. Voiding status: foly removed last night and     Output (mL)  Last Bowel Movement Date: 09/30/19 (10/01/19 0830)  Unmeasurable Output  Urine Occurrence(s): 1(Patient voided in preop) (09/30/19 1626)      Labs  Lab Results   Component Value Date/Time    HGB 9.8 (L) 10/01/2019 05:06 AM      Lab Results   Component Value Date/Time    INR 1.0 09/23/2019 01:32 PM        Recent Glucose Results:   Lab Results   Component Value Date/Time    GLUCPOC 166 (H) 10/01/2019 07:36 AM    GLUCPOC 257 (H) 09/30/2019 09:51 PM    GLUCPOC 167 (H) 09/30/2019 07:22 PM         Body mass index is 36.82 kg/m². : A BMI > 30 is classified as obesity and > 40 is classified as morbid obesity. Dressing c.d.i  Cryotherapy in place over incision  Calves soft and supple; No pain with passive stretch  Sensation and motor intact; left leg 4/5. SCDs for mechanical DVT proph while in bed     PLAN:  1) PT BID; slow - walked 8 feet and left leg weak. Buckled left leg yesterday. Weak but no buckling today. Dr. Keshia Lawton would like her to mobilize more. Likely will not need further decompression. 2) Plan for stage 2 of posterior fusion today.     Ange Suazo, NP  DNP, ACNP-BC, ONP-C

## 2019-10-01 NOTE — PROGRESS NOTES
Problem: Self Care Deficits Care Plan (Adult)  Goal: *Acute Goals and Plan of Care (Insert Text)  Description  FUNCTIONAL STATUS PRIOR TO ADMISSION: Patient was modified independent using a Single point cane for functional mobility. HOME SUPPORT: The patient lived with partner and needed assist due to her pain. Occupational Therapy Goals  Initiated 10/1/2019    1. Patient will perform lower body dressing with minimal assistance/contact guard assist using [employer] within 7 days. 2.  Patient will perform Bathing with minimal assistance/contact guard assist using most appropriate DME within 7 days. 3.  Patient will toileting at minimal assistance/contact guard assist within 7 days. 4.  Patient will don/doff back brace at minimal assistance/contact guard assist within 7 days. 5.  Patient will verbalize/demonstrate 3/3 back precautions during ADL tasks without cues within 7 days. Outcome: Progressing Towards Goal   OCCUPATIONAL THERAPY EVALUATION  Patient: Nicolasa Gray (6 y.o. female)  Date: 10/1/2019  Primary Diagnosis: Spinal stenosis of lumbar region with neurogenic claudication [M48.062]  Procedure(s) (LRB):  L2-4 POSTERIOR DECOMPRESSION AND FUSION (ANES GENERAL/CHOICE) (surg pain sol) (N/A) Day of Surgery   Precautions:   Back    ASSESSMENT  Based on the objective data described below, the patient presents with pt was living at home with  partner, and stated that she was using [facility] and was independent with ADLs and ILS . Pt was able to remain on RA, and was mod of 1 and min of another for bed mobility, and was min of 2 to stand. Pt was able to transfer to [facility] with CGA to min of 2 with RW. She was max to clean self and mod to mona her back brace. Pt was educated on back precautions and she was able to state 3/3 .     Current Level of Function Impacting Discharge (ADLs/self-care): she needs max for LB ADLs and min to mod for UB ADLs and mod for donning back brace    Functional Outcome Measure: The patient scored 40/100 on the Barthel outcome measure which is indicative of pt needs mod to max assist for ADLs and will need further OT tx . Patient will benefit from skilled therapy intervention to address the above noted impairments. PLAN :  Recommendations and Planned Interventions: self care training, functional mobility training, therapeutic exercise, balance training, endurance activities, patient education, home safety training and family training/education    Frequency/Duration: Patient will be followed by occupational therapy 4 times a week to address goals. Recommendation for discharge: (in order for the patient to meet his/her long term goals)  To be determined: until after the second part of back surgery     This discharge recommendation:  Has not yet been discussed the attending provider and/or case management    Equipment recommendations for successful discharge (if) home: tbd        SUBJECTIVE:   Patient stated I do pretty good at home but was moving slow.     OBJECTIVE DATA SUMMARY:   HISTORY:   Past Medical History:   Diagnosis Date    Chronic pain     back/steroid injections    Diabetes ([address]     GERD (gastroesophageal reflux disease)     hiatal hernia    Hypertension     Psychiatric disorder     anxiety/depression     Past Surgical History:   Procedure Laterality Date    HX HEENT  1995    left side Meniere's--balance nerve    HX ORTHOPAEDIC      thumb procedure (2 procedures)-biateral     HX ORTHOPAEDIC      right middle finger-hardware    HX OTHER SURGICAL      EGD/Colonoscopy (polyps)    HX NÉSTOR AND BSO      HX TONSILLECTOMY         Expanded or extensive additional review of patient history:     Home Situation  Home Environment: Private residence  # Steps to Enter: 3  Rails to Enter: Yes  Hand Rails : Bilateral  One/Two Story Residence: Two story  # of Interior Steps: 14  Interior Rails: Both  Living Alone: No  Support Systems: Spouse/Significant Other/Partner  Patient Expects to be Discharged to[de-identified] Private residence  Current DME Used/Available at Home: Pepper Robert, straight  Tub or Shower Type: Tub/Shower combination    Hand dominance: Right    EXAMINATION OF PERFORMANCE DEFICITS:  Cognitive/Behavioral Status:  Neurologic State: Alert  Orientation Level: Oriented X4  Cognition: Appropriate decision making; Follows commands  Perception: Appears intact  Perseveration: No perseveration noted  Safety/Judgement: Awareness of environment    Skin: in good health     Edema: none noted    Hearing: Auditory  Auditory Impairment: Deaf, Hard of hearing, left side(Deaf in L ear, [place] on R)  Hearing Aids/Status: Does not own    Vision/Perceptual:                 intact                    Range of Motion:    AROM: Within functional limits  PROM: Within functional limits                      Strength:    Strength: Generally decreased, functional(left quad 3+/5, ankle dorsiflexion 3+/5)                Coordination:  Coordination: Within functional limits  Fine Motor Skills-Upper: Left Intact; Right Intact    Gross Motor Skills-Upper: Left Intact; Right Intact    Tone & Sensation:    Tone: Normal  Sensation: Intact                      Balance:  Sitting: Impaired  Sitting - Static: Fair (occasional)  Sitting - Dynamic: Fair (occasional)  Standing: Impaired  Standing - Static: Good  Standing - Dynamic : Fair    Functional Mobility and Transfers for ADLs:  Bed Mobility:  Rolling: Moderate assistance;Minimum assistance;Assist x2  Supine to Sit: Moderate assistance; Additional time  Scooting: Moderate assistance; Additional time    Transfers:  Sit to Stand:  Moderate assistance;Assist x1;Assist x2  Bed to Chair: Minimum assistance;Assist x2  Toilet Transfer : Minimum assistance;Assist x2    ADL Assessment:  Feeding: Setup    Oral Facial Hygiene/Grooming: Setup    Bathing: Maximum assistance;Minimum assistance    Upper Body Dressing: Maximum assistance;Minimum assistance    Lower Body Dressing: Maximum assistance;Minimum assistance    Toileting: Maximum assistance;Minimum assistance           Cognitive Retraining  Safety/Judgement: Awareness of environment         Functional Measure:  Barthel Index:    Bathin  Bladder: 10  Bowels: 10  Groomin  Dressin  Feeding: 10  Mobility: 0  Stairs: 0  Toilet Use: 5  Transfer (Bed to Chair and Back): 0  Total: 40/100        The Barthel ADL Index: Guidelines  1. The index should be used as a record of what a patient does, not as a record of what a patient could do. 2. The main aim is to establish degree of independence from any help, physical or verbal, however minor and for whatever reason. 3. The need for supervision renders the patient not independent. 4. A patient's performance should be established using the best available evidence. Asking the patient, friends/relatives and nurses are the usual sources, but direct observation and common sense are also important. However direct testing is not needed. 5. Usually the patient's performance over the preceding 24-48 hours is important, but occasionally longer periods will be relevant. 6. Middle categories imply that the patient supplies over 50 per cent of the effort. 7. Use of aids to be independent is allowed. Perry Bonds., Barthel, DJoseWJose (1121). Functional evaluation: the Barthel Index. [facility] (14)2. ERIN Brown, Remberto Leung, Sara Schafer, [place], [address] [place] [address] (). Measuring the change indisability after inpatient rehabilitation; comparison of the responsiveness of the Barthel Index and Functional [place] Measure. Journal of Neurology, Neurosurgery, and Psychiatry, 66(4), 226-995. Lendon Soulier, N.[email or web address], LITZY Beltre, & Blade Bishop MJoseA. (2004.) Assessment of post-stroke quality of life in cost-effectiveness studies: The usefulness of the Barthel Index and the EuroQoL-5D.  Quality of Life Research, 15, 251-17         Occupational Therapy Evaluation Charge Determination   History Examination Decision-Making   MEDIUM Complexity : Expanded review of history including physical, cognitive and psychosocial  history  LOW Complexity : 1-3 performance deficits relating to physical, cognitive , or psychosocial skils that result in activity limitations and / or participation restrictions  LOW Complexity : No comorbidities that affect functional and no verbal or physical assistance needed to complete eval tasks       Based on the above components, the patient evaluation is determined to be of the following complexity level: LOW   Pain Ratin when she was sitting up in chair    Activity Tolerance:   Fair  Please refer to the flowsheet for vital signs taken during this treatment. After treatment patient left in no apparent distress:    Sitting in chair, Call bell within reach and Caregiver / family present    COMMUNICATION/EDUCATION:   The patients plan of care was discussed with: Physical Therapist, Registered Nurse and . Home safety education was provided and the patient/caregiver indicated understanding. and Patient/family have participated as able in goal setting and plan of care. This patients plan of care is appropriate for delegation to [facility].     Thank you for this referral.  Pamela Giang OT  Time Calculation: 41 mins

## 2019-10-01 NOTE — PROGRESS NOTES
Problem: Mobility Impaired (Adult and Pediatric)  Goal: *Acute Goals and Plan of Care (Insert Text)  Description  FUNCTIONAL STATUS PRIOR TO ADMISSION: Patient was modified independent using a Single point cane for functional mobility. HOME SUPPORT PRIOR TO ADMISSION: The patient lived with her spouse who works during the day. Physical Therapy Goals  Initiated 10/1/2019    1. Patient will move from supine to sit and sit to supine  in bed with modified independence within 4 days. 2. Patient will perform sit to stand with modified independence within 4 days. 3. Patient will ambulate with modified independence for 200 feet with the least restrictive device within 4 days. 4. Patient will ascend/descend 4 stairs with 1 handrail(s) with modified independence within 4 days. 5. Patient will verbalize and demonstrate understanding of spinal precautions (No bending, lifting greater than 5 lbs, or twisting; log-roll technique; frequent repositioning as instructed) within 4 days. 10/1/2019 1343 by Alex Salas, PT, DPT  Outcome: Progressing Towards Goal  10/1/2019 1211 by Alex Salas, PT, DPT  Outcome: Progressing Towards Goal   PHYSICAL THERAPY TREATMENT  Patient: Ary Nogueira (50 y.o. female)  Date: 10/1/2019  Diagnosis: Spinal stenosis of lumbar region with neurogenic claudication [M48.062] S/P lumbar spinal fusion  Procedure(s) (LRB):  L2-4 POSTERIOR DECOMPRESSION AND FUSION (ANES GENERAL/CHOICE) (surg pain sol) (N/A) Day of Surgery  Precautions: Back No bending, no lifting greater than 5 lbs, no twisting, log-roll technique, repositioning every 20-30 min except when sleeping, brace when OOB (if ordered)  Chart, physical therapy assessment, plan of care and goals were reviewed. ASSESSMENT  Patient continues with skilled PT services and is progressing towards goals. Patient seen again for additional gait in preparation for part 2 of her surgery.  Gait x20' using a RW with min-moderate assist. Noted unequal step length and increased trunk sway but gait quality improved over distance. Cued to lead with her left LE after weakness observed this am. She continues to c/o general LE weakness but no buckling observed during therapy. Appears to be progressing appropriately towards functional goals. Depending on progress after part 2 of surgery, anticipate discharge home with family and HHPT. Current Level of Function Impacting Discharge (mobility/balance): mod-min assist for gait, moderate assist for bed mobility    Other factors to consider for discharge: spouse works during the day         PLAN :  Patient continues to benefit from skilled intervention to address the above impairments. Continue treatment per established plan of care. to address goals. Recommendation for discharge: (in order for the patient to meet his/her long term goals)  Physical therapy at least 2 days/week in the home       Equipment recommendations for successful discharge (if) home: to be determined          SUBJECTIVE:   Patient stated I feel better than I did yesterday afternoon.     OBJECTIVE DATA SUMMARY:   Critical Behavior:  Neurologic State: Alert  Orientation Level: Oriented X4  Cognition: Follows commands       Spinal diagnosis intervention:  The patient stated 3/3 back precautions when prompted. Reviewed all 3 back precautions, log roll technique, and sitting for 30 minutes at a time. Reviewed back brace application and wear schedule. Brace donned with moderate assistance  standing at bedside     Functional Mobility Training:    Bed Mobility:  Log Rolling: Moderate assistance;Minimum assistance;Assist x2  Supine to Sit: Moderate assistance; Additional time     Scooting: Moderate assistance; Additional time        Transfers:  Sit to Stand:  Moderate assistance;Assist x1;Assist x2                                Balance:  Sitting: Impaired  Sitting - Static: Fair (occasional)  Sitting - Dynamic: Fair (occasional)  Standing: Impaired  Standing - Static: Good  Standing - Dynamic : Fair  Ambulation/Gait Training:  Distance (ft): 20 Feet (ft)  Assistive Device: Gait belt;Walker, rolling;Brace/Splint  Ambulation - Level of Assistance: Moderate assistance- minimal assistance        Gait Abnormalities: Antalgic;Decreased step clearance        Base of Support: Widened        Step Length: Left shortened;Right shortened                    Stairs: Therapeutic Exercises:     Pain Ratin/10    Activity Tolerance:   Fair  Please refer to the flowsheet for vital signs taken during this treatment.     After treatment patient left in no apparent distress:   Supine in bed    COMMUNICATION/COLLABORATION:   The patients plan of care was discussed with: Registered Nurse    Reuben Centeno PT, DPT   Time Calculation: 20 mins

## 2019-10-01 NOTE — ANESTHESIA PREPROCEDURE EVALUATION
Anesthetic History   No history of anesthetic complications            Review of Systems / Medical History  Patient summary reviewed, nursing notes reviewed and pertinent labs reviewed    Pulmonary  Within defined limits                 Neuro/Psych         Psychiatric history     Cardiovascular    Hypertension                   GI/Hepatic/Renal     GERD           Endo/Other    Diabetes         Other Findings   Comments: Anterior portion yesterday.            Physical Exam    Airway  Mallampati: III  TM Distance: 4 - 6 cm  Neck ROM: normal range of motion   Mouth opening: Normal     Cardiovascular    Rhythm: regular  Rate: normal         Dental  No notable dental hx       Pulmonary  Breath sounds clear to auscultation               Abdominal  Abdominal exam normal       Other Findings            Anesthetic Plan    ASA: 3  Anesthesia type: general          Induction: Intravenous  Anesthetic plan and risks discussed with: Patient

## 2019-10-02 LAB
GLUCOSE BLD STRIP.AUTO-MCNC: 105 MG/DL (ref 65–100)
GLUCOSE BLD STRIP.AUTO-MCNC: 138 MG/DL (ref 65–100)
GLUCOSE BLD STRIP.AUTO-MCNC: 143 MG/DL (ref 65–100)
GLUCOSE BLD STRIP.AUTO-MCNC: 157 MG/DL (ref 65–100)
HGB BLD-MCNC: 7.7 G/DL (ref 11.5–16)
SERVICE CMNT-IMP: ABNORMAL

## 2019-10-02 PROCEDURE — 82962 GLUCOSE BLOOD TEST: CPT

## 2019-10-02 PROCEDURE — 65270000029 HC RM PRIVATE

## 2019-10-02 PROCEDURE — 97530 THERAPEUTIC ACTIVITIES: CPT | Performed by: PHYSICAL THERAPIST

## 2019-10-02 PROCEDURE — 97168 OT RE-EVAL EST PLAN CARE: CPT

## 2019-10-02 PROCEDURE — 74011636637 HC RX REV CODE- 636/637: Performed by: ORTHOPAEDIC SURGERY

## 2019-10-02 PROCEDURE — 97116 GAIT TRAINING THERAPY: CPT

## 2019-10-02 PROCEDURE — 77010033678 HC OXYGEN DAILY

## 2019-10-02 PROCEDURE — 97116 GAIT TRAINING THERAPY: CPT | Performed by: PHYSICAL THERAPIST

## 2019-10-02 PROCEDURE — 94760 N-INVAS EAR/PLS OXIMETRY 1: CPT

## 2019-10-02 PROCEDURE — 74011250637 HC RX REV CODE- 250/637: Performed by: ORTHOPAEDIC SURGERY

## 2019-10-02 PROCEDURE — 97535 SELF CARE MNGMENT TRAINING: CPT

## 2019-10-02 PROCEDURE — 97164 PT RE-EVAL EST PLAN CARE: CPT | Performed by: PHYSICAL THERAPIST

## 2019-10-02 PROCEDURE — 97530 THERAPEUTIC ACTIVITIES: CPT

## 2019-10-02 PROCEDURE — 85018 HEMOGLOBIN: CPT

## 2019-10-02 PROCEDURE — 36415 COLL VENOUS BLD VENIPUNCTURE: CPT

## 2019-10-02 RX ADMIN — Medication 10 ML: at 07:19

## 2019-10-02 RX ADMIN — GABAPENTIN 300 MG: 300 CAPSULE ORAL at 09:43

## 2019-10-02 RX ADMIN — ACETAMINOPHEN 1000 MG: 500 TABLET ORAL at 07:19

## 2019-10-02 RX ADMIN — ACETAMINOPHEN 1000 MG: 500 TABLET ORAL at 23:50

## 2019-10-02 RX ADMIN — METFORMIN HYDROCHLORIDE 500 MG: 500 TABLET ORAL at 17:24

## 2019-10-02 RX ADMIN — CITALOPRAM HYDROBROMIDE 40 MG: 20 TABLET ORAL at 18:48

## 2019-10-02 RX ADMIN — Medication 10 ML: at 15:33

## 2019-10-02 RX ADMIN — METFORMIN HYDROCHLORIDE 500 MG: 500 TABLET ORAL at 09:43

## 2019-10-02 RX ADMIN — ACETAMINOPHEN 1000 MG: 500 TABLET ORAL at 13:10

## 2019-10-02 RX ADMIN — POLYETHYLENE GLYCOL 3350 17 G: 17 POWDER, FOR SOLUTION ORAL at 09:41

## 2019-10-02 RX ADMIN — INSULIN LISPRO 4 UNITS: 100 INJECTION, SOLUTION INTRAVENOUS; SUBCUTANEOUS at 13:12

## 2019-10-02 RX ADMIN — GABAPENTIN 300 MG: 300 CAPSULE ORAL at 21:16

## 2019-10-02 RX ADMIN — INSULIN LISPRO 2 UNITS: 100 INJECTION, SOLUTION INTRAVENOUS; SUBCUTANEOUS at 09:37

## 2019-10-02 RX ADMIN — ATORVASTATIN CALCIUM 20 MG: 20 TABLET, FILM COATED ORAL at 09:43

## 2019-10-02 RX ADMIN — ACETAMINOPHEN 1000 MG: 500 TABLET ORAL at 18:48

## 2019-10-02 RX ADMIN — PANTOPRAZOLE SODIUM 40 MG: 40 TABLET, DELAYED RELEASE ORAL at 07:19

## 2019-10-02 RX ADMIN — Medication 10 ML: at 21:16

## 2019-10-02 RX ADMIN — GABAPENTIN 300 MG: 300 CAPSULE ORAL at 17:24

## 2019-10-02 RX ADMIN — SENNOSIDES, DOCUSATE SODIUM 1 TABLET: 50; 8.6 TABLET, FILM COATED ORAL at 09:41

## 2019-10-02 NOTE — PROGRESS NOTES
ORTHO POST OP SPINE PROGRESS NOTE    2019  Admit Date: 2019  Admit Diagnosis: Spinal stenosis of lumbar region with neurogenic claudication [M48.062]  Procedure: Procedure(s):  L2-4 POSTERIOR DECOMPRESSION AND FUSION  Post Op day: 1 Day Post-Op    Subjective:     Anne Cates is a patient who has complaints Of pain in the low back postop day 1 and 2 following L2 through L4 posterior and lateral decompression and fusion. She denies any pain down into legs although states her legs feel weak. She states she feels \"terrible\" and is discouraged about the amount of pain that she is having. She has pain in the  left hip due to surgical approach. Tolerating p.o. And able to void  Review of Systems: Pertinent items are noted in HPI. Objective:     PT/OT:   Distance Ambulated:           Time Ambulated (min):        Ambulation Response: Activity Response: Fairly tolerated  Assistive Device:              Assistive Device: Fall prevention device, Walker (comment)    Vital Signs:    Blood pressure 101/48, pulse 83, temperature 98.7 °F (37.1 °C), resp. rate 17, height 5' 1\" (1.549 m), weight 88.4 kg (194 lb 14.2 oz), SpO2 97 %, not currently breastfeeding. Temp (24hrs), Av.5 °F (36.9 °C), Min:98 °F (36.7 °C), Max:99.1 °F (37.3 °C)      No intake/output data recorded.    1901 - 10/02 0700  In: 1000 [I.V.:1000]  Out: 0 [Urine:1150]    LAB:    Recent Labs     10/02/19  0408   HGB 7.7*       Wound/Drain Assessment:  Drain:      Dressing:     Physical Exam:  Neurological: no deficit  Incision clean, dry, and intact  5/5 strength bilateral lower extremities with the exception of the left hip flexors and left straight leg raise which are 3/5    Assessment:      Patient Active Problem List   Diagnosis Code    Spinal stenosis of lumbar region with neurogenic claudication M48.062    S/P lumbar spinal fusion Z98.1       Plan:     Continue PT/OT/Rehab  Discontinue: IV  Consult: PT  and OT    Discharge To: Home. Likely with home health pending progress.   Unlikely today

## 2019-10-02 NOTE — PROGRESS NOTES
SHONDA: home with home health     Reason for Admission: L2-4 posterior decompression and fusion                    RRAT Score:  7                   Plan for utilizing home health: At home care                       Current Advanced Directive/Advance Care Plan: not at this time                          Transition of Care Plan:                      Patient is a 75 y/o  female who was admitted to [facility] for a planned L2-4 posterior decompression and fusion. CM made room visit with patient who was alert and oriented with partner and sister at bedside. Patient confirmed demographics, insurance, and PCP (last seen 2 wk ago). Patient uses [facility]. Patient and partner reside in a 2 story home with 3 entry steps and master bedroom on the second floor. Patient has 2 sisters that live nearby for additional support. Patient has no DME. Prior to admission patient was independent with ALDs, IADLs (pain and difficulty) and driving. Patient has no history of HH, SNF, or IPR. Patient partner to transport patient upon d/c. Patient's plan is to return home with home health upon d/c. Patient would like to use [facility]. FOC signed and on bedside chart. Referral has been sent to [facility] and waiting for response. Care Management Interventions  PCP Verified by CM: Yes  Mode of Transport at Discharge:  Other (see comment)  Transition of Care Consult (CM Consult): Discharge Planning, Home Health  Discharge Durable Medical Equipment: No  Physical Therapy Consult: Yes  Occupational Therapy Consult: Yes  Speech Therapy Consult: No  Current Support Network: Lives with Spouse, Own Home, Family Lives Nearby  Confirm Follow Up Transport: Family  Plan discussed with Pt/Family/Caregiver: Yes  Freedom of Choice Offered: Yes  Discharge Location  Discharge Placement: Home with home health    Babatunde Blackmon, [address]

## 2019-10-02 NOTE — OP NOTES
[address]  OPERATIVE REPORT    Name:  Dejah Spence  MR#:  [id number]  :  1945  ACCOUNT #:  [de-identified]  DATE OF SERVICE:  10/01/2019    PREOPERATIVE DIAGNOSES:  1.  Scoliosis. 2.  Spinal stenosis. 3.  Lumbago. 4.  Sciatica. POSTOPERATIVE DIAGNOSES:  1.  Scoliosis. 2.  Spinal stenosis. 3.  Lumbago. 4.  Sciatica. PROCEDURES PERFORMED:  1. An L2 through L4 posterior fusion. 2.  An L2 through L4 posterior instrumentation. 3.  Use of allograft bone for spine fusion. 4.  Bone marrow aspirate from the left posterior superior iliac spine for spinal fusion augmentation. SURGEON:  Alex Marrero MD    FIRST ASSISTANT:  Ridge Serrano. [place], [place].    ANESTHESIA:  General.    COMPLICATIONS:  None. SPECIMENS REMOVED:  None. IMPLANTS:  [employer] pedicle screw system. ESTIMATED BLOOD LOSS:  250 mL. DRAINS:  None. INDICATIONS FOR THE PROCEDURE:  The patient is a pleasant 55-year-old lady with lumbar spinal stenosis, lumbago, and sciatica in the setting of scoliosis. She has failed to improve with nonoperative treatment. At this point, she would like to proceed with surgical intervention. I have given her warnings about the possible complications including but not limited to pain, scar, bleeding, infection, nonunion, damage to surrounding structures, death, paralysis, blindness, or stroke. She understands and wants to proceed. NARRATIVE OF THE PROCEDURE:  After informed consent was obtained and the operative site was properly marked, the patient was moved back to the operating room and underwent general endotracheal anesthesia. She was positioned prone on the operating table using the [employer] frame. Her arms were placed in the 90:90 position and knees were gently bent with pillows. Fluoroscopy was used to bright the level of the incision. We then proceeded to prep and drape in the usual manner.   Time-out was obtained verifying that this was the correct patient, the correct surgery, the correct site as well as that she had received IV antibiotics within 30 minutes of the incision, in this case she received 2 g of IV Ancef. I then proceeded to prep and drape in the usual manner, followed by obtaining a time-out, verifying that this is the correct patient, the correct surgery, the correct site, as well as that she had received IV antibiotics within 30 minutes of the incision. I then proceeded to perform a stab incision over the left posterior superior iliac spine and proceeded to use that stab incision to place a Jamshidi needle to aspirate bone marrow to use for spinal fusion augmentation. Once that was completed, I proceeded to place a navigation marker on the left side and another one on the right and bring in the fluoroscope to obtain x-rays for the navigation using the [employer] robotic navigation system. The fluoroscope was used to document the levels of the L2, L3, and L4. Once those levels were registered perfectly, we proceeded to perform what is called a merge where we were able to verify that the CT scan done preoperatively and the preoperative planning for screw placement were compatible with x-ray images. Once we were satisfied with that merge, I proceeded to bright the incisions for bilateral Marta approaches and then starting on the right side I proceeded to being at L2 using the entry drill following by the cath under power and then followed by placing the preplanned screw at the L2 pedicle. Once this was completed, I repeated the procedure at L3 and L4 on the right and then moved on to the left and repeated to the L3 levels on the left also. With all the screws in place, I proceeded to obtain x-rays, verified that we were satisfied with the position. I then proceeded to decorticate the posterior elements bilaterally from L2 through L4 and placed the allograft bone that we had selected for the posterior fusion.   Once the allograft bone was selected and placed, I proceeded to place my rods and locking caps and final tightened the construct in place for the posterior instrumentation. Once this was completed, the fascia was closed with #1 Vicryl, followed by irrigating the subcu, closed the subcu with 2-0 Vicryl, and the skin with a 3-0 running Monocryl and Dermabond. Sterile dressing was applied. The patient was then awakened and transferred to PACU in stable condition. MD PRIYA Alcala/[id number]  D:  10/01/2019 16:45  T:  10/01/2019 22:28  JOB #:  [id number]  CC:  MD Cheryle Shukla.  Jayy Pichardo

## 2019-10-02 NOTE — PROGRESS NOTES
Ortho / Neurosurgery NP Note    POD# 1  s/p L2-4 POSTERIOR DECOMPRESSION AND FUSION       Pt resting in bed, with complaints of left hip pain related to surgical approach. States that right leg pain that was present prior to surgery seems to have improved. VSS Afebrile. Voiding status: voiding     Labs  Lab Results   Component Value Date/Time    HGB 7.7 (L) 10/02/2019 04:08 AM      Lab Results   Component Value Date/Time    INR 1.0 09/23/2019 01:32 PM        Body mass index is 36.82 kg/m². : A BMI > 30 is classified as obesity and > 40 is classified as morbid obesity. Dressing c.d.i    Calves soft and supple; No pain with passive stretch  Sensation and motor intact  SCDs for mechanical DVT proph while in bed     PLAN:  1) PT BID  2) Pain management   3) GI Prophylaxis - Protonix   4) Readniess for discharge:     [x] Vital Signs stable    [x] Hgb stable    [x] + Voiding    [x] Wound intact, drainage minimal    [x] Tolerating PO intake     [] Cleared by PT (OT if applicable)     [] Stair training completed (if applicable)    [] Independent / Contact Guard Assist (household distance)     [] Bed mobility     [] Car transfers     [] ADLs    [x] Adequate pain control on oral medication alone     Plan for discharge pending progression with therapy, possibly today vs tomorrow.      Elio Mosley, LATHA  DNP, AGACNP-BC

## 2019-10-02 NOTE — PROGRESS NOTES
Problem: Falls - Risk of  Goal: *Absence of Falls  Description  Document [place] Fall Risk and appropriate interventions in the flowsheet.   Outcome: Progressing Towards Goal  Note:   Fall Risk Interventions:  Mobility Interventions: Assess mobility with egress test         Medication Interventions: Assess postural VS orthostatic hypotension    Elimination Interventions: Call light in reach

## 2019-10-02 NOTE — PROGRESS NOTES
Orthopedic End of Shift Note    Bedside shift change report given to Nlisa (oncoming nurse) by Leeroy Clark (offgoing nurse). Report included the following information SBAR, Kardex, Procedure Summary, Intake/Output, MAR, Accordion, Recent Results and Med Rec Status. POD# 2, 1    Significant issues during shift:    Issues for Physician to address:      Activity This Shift  (check all that apply) [] chair  [] dangle   [] bathroom  [x] bedside commode [] hallway  [] bedrest   Nausea/Vomiting [] yes [x] no     Voiding Status [x] void [] White [] I&O Cath   Bowel Movements [] yes [x] no     Foot Pumps or SCD [x] yes [] no    Ice Pack [] yes    [] no    Incentive Spirometer [x] yes [] no Volume:   1550   Telemetry Monitoring   [] yes [x] no Rhythm:   Supplemental O2 [] yes [x] no Sat off O2:   95%

## 2019-10-02 NOTE — PROGRESS NOTES
Problem: Mobility Impaired (Adult and Pediatric)  Goal: *Acute Goals and Plan of Care (Insert Text)  Description  FUNCTIONAL STATUS PRIOR TO ADMISSION: Patient was modified independent using a Single point cane for functional mobility. HOME SUPPORT PRIOR TO ADMISSION: The patient lived with her spouse who works during the day. Physical Therapy Goals  Initiated 10/1/2019  Goals reviewed and remain appropriate for next 7 days 10/2/19    1. Patient will move from supine to sit and sit to supine  in bed with modified independence within 4 days. 2. Patient will perform sit to stand with modified independence within 4 days. 3. Patient will ambulate with modified independence for 200 feet with the least restrictive device within 4 days. 4. Patient will ascend/descend 4 stairs with 1 handrail(s) with modified independence within 4 days. 5. Patient will verbalize and demonstrate understanding of spinal precautions (No bending, lifting greater than 5 lbs, or twisting; log-roll technique; frequent repositioning as instructed) within 4 days. Outcome: Progressing Towards Goal   PHYSICAL THERAPY REEVALUATION  Patient: Angelique Barreto (45 y.o. female)  Date: 10/2/2019  Primary Diagnosis: Spinal stenosis of lumbar region with neurogenic claudication [M48.062]  Procedure(s) (LRB):  L2-4 POSTERIOR DECOMPRESSION AND FUSION (N/A) 1 Day Post-Op   Precautions: Back; deaf in L ear      ASSESSMENT  Based on the objective data described below, the patient presents with impaired functional mobility, balance and endurance. Patient reporting pain at surgical sites and in L groin- nursing aware. Patient demonstrating increased weakness in L knee with decreased graded control with mild knee recurvatum but no knee buckling noted during ambulation.  Patient only able to ambulate 50 feet in webb this am. Would benefit from further therapy prior to discharge from hospital.    Current Level of Function Impacting Discharge (mobility/balance): min/mod A for bed mobility and CGA/min A for transfers and ambulation    Functional Outcome Measure: The patient scored 5/20 on the Elderly Mobility scale outcome measure which is indicative of dependence for mobility at this time. Other factors to consider for discharge: has good family support     Patient will benefit from skilled therapy intervention to address the above noted impairments. PLAN :  Recommendations and Planned Interventions: bed mobility training, transfer training, gait training, patient and family training/education and therapeutic activities      Frequency/Duration: Patient will be followed by physical therapy:  twice daily to address goals. Recommendation for discharge: (in order for the patient to meet his/her long term goals)  Physical therapy at least 2 days/week in the home AND ensure assist and/or supervision for safety with overall mobility at present     This discharge recommendation:  Has been made in collaboration with the attending provider and/or case management    Equipment recommendations for successful discharge (if) home: bedside commode and rolling walker         SUBJECTIVE:   Patient stated My leg feels a little weak.     OBJECTIVE DATA SUMMARY:   HISTORY:    Past Medical History:   Diagnosis Date    Chronic pain     back/steroid injections    Diabetes ([address]     GERD (gastroesophageal reflux disease)     hiatal hernia    Hypertension     Psychiatric disorder     anxiety/depression     Past Surgical History:   Procedure Laterality Date    HX HEENT  1995    left side Meniere's--balance nerve    HX ORTHOPAEDIC      thumb procedure (2 procedures)-biateral     HX ORTHOPAEDIC      right middle finger-hardware    HX OTHER SURGICAL      EGD/Colonoscopy (polyps)    HX [facility]  [facility] course since last seen and reason for reevaluation: 2nd part of back surgery including L2-4 posterior decompression and fusion        Home Situation  Home Environment: Private residence  # Steps to Enter: 3  Rails to Enter: Yes  Hand Rails : Bilateral  One/Two Story Residence: Two story  # of Interior Steps: 15  Interior Rails: Both  Living Alone: No  Support Systems: Spouse/Significant Other/Partner  Patient Expects to be Discharged to[de-identified] Private residence  Current DME Used/Available at Home: Cane, straight  Tub or Shower Type: Tub/Shower combination    EXAMINATION/PRESENTATION/DECISION MAKING:   Critical Behavior:  Neurologic State: Drowsy  Orientation Level: Oriented X4  Cognition: Follows commands  Safety/Judgement: Awareness of environment  Hearing: Auditory  Auditory Impairment: Deaf, Hard of hearing, left side(Deaf in L ear, [place] on R)  Hearing Aids/Status: Does not own    Range Of Motion:  AROM: Generally decreased, functional                       Strength:    Strength: Generally decreased, functional                    Tone & Sensation:   Tone: Normal              Sensation: Intact               Coordination:  Coordination: Within functional limits       Functional Mobility:  Bed Mobility:  Rolling: Moderate assistance;Minimum assistance;Assist x1;Additional time  Supine to Sit: Minimum assistance; Moderate assistance;Assist x1;Additional time     Scooting: Contact guard assistance; Additional time  Transfers:  Sit to Stand: Minimum assistance;Contact guard assistance;Assist x2; Additional time  Stand to Sit: Contact guard assistance; Additional time        Bed to Chair: Contact guard assistance;Minimum assistance;Assist x2              Balance:   Sitting: Impaired  Sitting - Static: Good (unsupported)  Sitting - Dynamic: Good (unsupported)  Standing: Impaired  Standing - Static: Fair;Constant support  Standing - Dynamic : Fair;Constant support  Ambulation/Gait Training:  Distance (ft): 50 Feet (ft)  Assistive Device: Walker, rolling;Gait belt  Ambulation - Level of Assistance: Contact guard assistance;Assist x2        Gait Abnormalities: Decreased step clearance; Antalgic(decreased graded control in LLE; mild recurvatum)        Base of Support: Widened        Step Length: Left shortened;Right shortened(R>L)      Gait is slow and slightly unsteady demonstrating impaired graded control with mild recurvatum in L knee         Functional Measure:    Elder Mobility Scale    5/20         Scores under 10 - generally these patients are dependent in mobility maneuvers; require help with  basic ADL, such as transfers, toileting and dressing. Scores between 10 - 13 - generally these patients are borderline in terms of safe mobility and  independence in ADL i.e. they require some help with some mobility maneuvers. Scores over 14 - Generally these patients are able to perform mobility maneuvers alone and safely  and are independent in basic ADL. Pain Rating:  Reports pain at surgical sites and in L groin. Did not rate. Nursing notified    Activity Tolerance:   Fair and requires rest breaks  Please refer to the flowsheet for vital signs taken during this treatment. After treatment patient left in no apparent distress:   Sitting in chair, Call bell within reach and Caregiver / family present    COMMUNICATION/EDUCATION:   The patients plan of care was discussed with: Occupational Therapist, Registered Nurse and . Fall prevention education was provided and the patient/caregiver indicated understanding., Patient/family have participated as able in goal setting and plan of care. and Patient/family agree to work toward stated goals and plan of care.     Thank you for this referral.  Francisca Vyas, PT   Time Calculation: 35 mins

## 2019-10-02 NOTE — PROGRESS NOTES
Orthopedic End of Shift Note    Bedside shift change report given to Bard Gonzalez RN (oncoming nurse) by Malik Salcedo RN (offgoing nurse). Report included the following information SBAR, Kardex, OR Summary, Procedure Summary, Intake/Output, MAR, Accordion, Recent Results and Med Rec Status.      POD#     Significant issues during shift: none    Issues for Physician to address: none    Activity This Shift  (check all that apply) [x] chair  [] dangle   [x] bathroom  [x] bedside commode [x] hallway  [] bedrest   Nausea/Vomiting [] yes [x] no     Voiding Status [x] void [] White [] I&O Cath   Bowel Movements [] yes [x] no     Foot Pumps or SCD [x] yes [] no    Ice Pack [] yes    [x] no    Incentive Spirometer [] yes [] no Volume:      Telemetry Monitoring   [] yes [x] no Rhythm:   Supplemental O2 [] yes [x] no Sat off O2:   96%

## 2019-10-02 NOTE — PROGRESS NOTES
Problem: Mobility Impaired (Adult and Pediatric)  Goal: *Acute Goals and Plan of Care (Insert Text)  Description  FUNCTIONAL STATUS PRIOR TO ADMISSION: Patient was modified independent using a Single point cane for functional mobility. HOME SUPPORT PRIOR TO ADMISSION: The patient lived with her spouse who works during the day. Physical Therapy Goals  Initiated 10/1/2019  Goals reviewed and remain appropriate for next 7 days 10/2/19    1. Patient will move from supine to sit and sit to supine  in bed with modified independence within 4 days. 2. Patient will perform sit to stand with modified independence within 4 days. 3. Patient will ambulate with modified independence for 200 feet with the least restrictive device within 4 days. 4. Patient will ascend/descend 4 stairs with 1 handrail(s) with modified independence within 4 days. 5. Patient will verbalize and demonstrate understanding of spinal precautions (No bending, lifting greater than 5 lbs, or twisting; log-roll technique; frequent repositioning as instructed) within 4 days. Outcome: Progressing Towards Goal   PHYSICAL THERAPY TREATMENT  Patient: Catarina Obrien (85 y.o. female)  Date: 10/2/2019  Diagnosis: Spinal stenosis of lumbar region with neurogenic claudication [M48.062] S/P lumbar spinal fusion  Procedure(s) (LRB):  L2-4 POSTERIOR DECOMPRESSION AND FUSION (N/A) 1 Day Post-Op  Precautions: Back No bending, no lifting greater than 5 lbs, no twisting, log-roll technique, repositioning every 20-30 min except when sleeping, brace when OOB (if ordered)  Chart, physical therapy assessment, plan of care and goals were reviewed. ASSESSMENT  Patient continues with skilled PT services and is progressing towards goals. Pt presents wit decreased strength ain LLE and increased pain with mobility. Although with improved LLE strength and stability of L knee in stance but still easily fatigued. Pt ambulated 75ft and 200ft with RW at [facility].  Pt ascended and descended 4 steps with both railings at [facility] with cueing for sequencing. Pt performed a car transfer at min A for LLE and cueing to maintain back precautions with transfer. Pt will need to perform stairs and car transfer in AM session to clear. Current Level of Function Impacting Discharge (mobility/balance): Pt performed sit to stand transfer at [facility]. Pt requiring CGA for ambulation due to LLE weakness and possible buckling. Other factors to consider for discharge: good family support          PLAN :  Patient continues to benefit from skilled intervention to address the above impairments. Continue treatment per established plan of care. to address goals. Recommendation for discharge: (in order for the patient to meet his/her long term goals)  Physical therapy at least 2 days/week in the home     This discharge recommendation:  Has been made in collaboration with the attending provider and/or case management    Equipment recommendations for successful discharge (if) home: rolling walker       SUBJECTIVE:   Patient stated  It feels a little stronger now.     OBJECTIVE DATA SUMMARY:   Critical Behavior:  Neurologic State: Alert  Orientation Level: Appropriate for age  Cognition: Appropriate decision making  Safety/Judgement: Awareness of environment    Spinal diagnosis intervention:  The patient stated 2/3 back precautions when prompted. Reviewed all 3 back precautions, log roll technique, and sitting for 30 minutes at a time. Functional Mobility Training:    Bed Mobility:  Log Rolling: Moderate assistance;Minimum assistance;Assist x1;Additional time  Supine to Sit: Minimum assistance; Moderate assistance;Assist x1;Additional time  Sit to Supine: Minimum assistance  Scooting: Contact guard assistance        Transfers:  Sit to Stand: Contact guard assistance; Additional time  Stand to Sit: Contact guard assistance        Bed to Chair: Contact guard assistance;Minimum assistance;Assist x2 Balance:  Sitting: Intact  Sitting - Static: Good (unsupported)  Sitting - Dynamic: Good (unsupported)  Standing: Intact; With support  Standing - Static: Good;Constant support  Standing - Dynamic : Fair;Constant support  Ambulation/Gait Training:  Distance (ft): 275 Feet (ft)(200ft and 75ft)  Assistive Device: Gait belt;Walker, rolling  Ambulation - Level of Assistance: Contact guard assistance        Gait Abnormalities: Decreased step clearance        Base of Support: Widened        Step Length: Right shortened;Left shortened                    Stairs:  Number of Stairs Trained: 4  Stairs - Level of Assistance: Contact guard assistance   Rail Use: Both    Pain Rating:  Pt reported increased pain with sit to stand transfer    Activity Tolerance:   Good and requires rest breaks with improved mobility safety and tolerance. Please refer to the flowsheet for vital signs taken during this treatment.     After treatment patient left in no apparent distress:   Supine in bed, Call bell within reach, Caregiver / family present and Side rails x 3    COMMUNICATION/COLLABORATION:   The patients plan of care was discussed with: Registered Nurse    Jaqueline Acevedo PTA   Time Calculation: 27 mins

## 2019-10-03 VITALS
HEIGHT: 61 IN | DIASTOLIC BLOOD PRESSURE: 56 MMHG | BODY MASS INDEX: 36.8 KG/M2 | SYSTOLIC BLOOD PRESSURE: 123 MMHG | OXYGEN SATURATION: 98 % | RESPIRATION RATE: 18 BRPM | HEART RATE: 72 BPM | TEMPERATURE: 98 F | WEIGHT: 194.89 LBS

## 2019-10-03 LAB
GLUCOSE BLD STRIP.AUTO-MCNC: 114 MG/DL (ref 65–100)
GLUCOSE BLD STRIP.AUTO-MCNC: 172 MG/DL (ref 65–100)
SERVICE CMNT-IMP: ABNORMAL
SERVICE CMNT-IMP: ABNORMAL

## 2019-10-03 PROCEDURE — 97116 GAIT TRAINING THERAPY: CPT

## 2019-10-03 PROCEDURE — 97535 SELF CARE MNGMENT TRAINING: CPT

## 2019-10-03 PROCEDURE — 74011250637 HC RX REV CODE- 250/637: Performed by: ORTHOPAEDIC SURGERY

## 2019-10-03 PROCEDURE — 94760 N-INVAS EAR/PLS OXIMETRY 1: CPT

## 2019-10-03 PROCEDURE — 97530 THERAPEUTIC ACTIVITIES: CPT

## 2019-10-03 PROCEDURE — 82962 GLUCOSE BLOOD TEST: CPT

## 2019-10-03 PROCEDURE — 74011636637 HC RX REV CODE- 636/637: Performed by: ORTHOPAEDIC SURGERY

## 2019-10-03 RX ORDER — ACETAMINOPHEN 500 MG
1000 TABLET ORAL EVERY 6 HOURS
Qty: 112 TAB | Refills: 0 | Status: SHIPPED | OUTPATIENT
Start: 2019-10-03 | End: 2019-10-17

## 2019-10-03 RX ORDER — OXYCODONE HYDROCHLORIDE 5 MG/1
5 TABLET ORAL
Qty: 60 TAB | Refills: 0 | Status: SHIPPED | OUTPATIENT
Start: 2019-10-03 | End: 2019-10-17

## 2019-10-03 RX ORDER — AMOXICILLIN 250 MG
1 CAPSULE ORAL 2 TIMES DAILY
Qty: 30 TAB | Refills: 0 | Status: SHIPPED | OUTPATIENT
Start: 2019-10-03 | End: 2019-10-18

## 2019-10-03 RX ORDER — POLYETHYLENE GLYCOL 3350 17 G/17G
17 POWDER, FOR SOLUTION ORAL DAILY
Qty: 15 PACKET | Refills: 0 | Status: SHIPPED | OUTPATIENT
Start: 2019-10-04 | End: 2019-10-19

## 2019-10-03 RX ADMIN — Medication 10 ML: at 06:28

## 2019-10-03 RX ADMIN — ATORVASTATIN CALCIUM 20 MG: 20 TABLET, FILM COATED ORAL at 08:44

## 2019-10-03 RX ADMIN — GABAPENTIN 300 MG: 300 CAPSULE ORAL at 08:44

## 2019-10-03 RX ADMIN — INSULIN LISPRO 4 UNITS: 100 INJECTION, SOLUTION INTRAVENOUS; SUBCUTANEOUS at 11:55

## 2019-10-03 RX ADMIN — PANTOPRAZOLE SODIUM 40 MG: 40 TABLET, DELAYED RELEASE ORAL at 08:44

## 2019-10-03 RX ADMIN — ACETAMINOPHEN 1000 MG: 500 TABLET ORAL at 06:00

## 2019-10-03 RX ADMIN — INSULIN LISPRO 2 UNITS: 100 INJECTION, SOLUTION INTRAVENOUS; SUBCUTANEOUS at 08:41

## 2019-10-03 RX ADMIN — METFORMIN HYDROCHLORIDE 500 MG: 500 TABLET ORAL at 08:44

## 2019-10-03 NOTE — PROGRESS NOTES
Ortho / Neurosurgery NP Note    POD# 2  s/p L2-4 POSTERIOR DECOMPRESSION AND FUSION       Pt sitting up on side of bed getting ready to work with therapy. No complaints. VSS Afebrile. Voiding status: voiding     Labs  Lab Results   Component Value Date/Time    HGB 7.7 (L) 10/02/2019 04:08 AM      Lab Results   Component Value Date/Time    INR 1.0 09/23/2019 01:32 PM        Body mass index is 36.82 kg/m². : A BMI > 30 is classified as obesity and > 40 is classified as morbid obesity. Dressing c.d.i    Calves soft and supple; No pain with passive stretch  Sensation and motor intact  SCDs for mechanical DVT proph while in bed     PLAN:  1) PT BID  2) Pain management   3) GI Prophylaxis - Protonix   4) Readniess for discharge:     [x] Vital Signs stable    [x] Hgb stable    [x] + Voiding    [x] Wound intact, drainage minimal    [x] Tolerating PO intake     [] Cleared by PT (OT if applicable)     [] Stair training completed (if applicable)    [] Independent / Contact Guard Assist (household distance)     [] Bed mobility     [] Car transfers     [] ADLs    [x] Adequate pain control on oral medication alone     Plan for discharge pending progression with therapy, possibly today.      Irene Landry, NP  DNP, AGACNP-BC

## 2019-10-03 NOTE — PROGRESS NOTES
Problem: Mobility Impaired (Adult and Pediatric)  Goal: *Acute Goals and Plan of Care (Insert Text)  Description  FUNCTIONAL STATUS PRIOR TO ADMISSION: Patient was modified independent using a Single point cane for functional mobility. HOME SUPPORT PRIOR TO ADMISSION: The patient lived with her spouse who works during the day. Physical Therapy Goals  Initiated 10/1/2019  Goals reviewed and remain appropriate for next 7 days 10/2/19    1. Patient will move from supine to sit and sit to supine  in bed with modified independence within 4 days. 2. Patient will perform sit to stand with modified independence within 4 days. 3. Patient will ambulate with modified independence for 200 feet with the least restrictive device within 4 days. 4. Patient will ascend/descend 4 stairs with 1 handrail(s) with modified independence within 4 days. 5. Patient will verbalize and demonstrate understanding of spinal precautions (No bending, lifting greater than 5 lbs, or twisting; log-roll technique; frequent repositioning as instructed) within 4 days. Outcome: Resolved/Met   PHYSICAL THERAPY TREATMENT  Patient: Grisel Burrell (35 y.o. female)  Date: 10/3/2019  Diagnosis: Spinal stenosis of lumbar region with neurogenic claudication [M48.062] S/P lumbar spinal fusion  Procedure(s) (LRB):  L2-4 POSTERIOR DECOMPRESSION AND FUSION (N/A) 2 Days Post-Op  Precautions: Back No bending, no lifting greater than 5 lbs, no twisting, log-roll technique, repositioning every 20-30 min except when sleeping, brace when OOB (if ordered)  Chart, physical therapy assessment, plan of care and goals were reviewed. ASSESSMENT  Patient continues with skilled PT services and is progressing towards goals. Pt presents with decreased strength and increased pain with sit to stand transfer. Pt with increased overall mobility safety and tolerance. Pt ascended and descended 4 step with both ralings.  Pt performed a car transfer at [facility] with cueing for sequencing. Pt still with increased pain during sit to stand transfer but able to perform at [facility]. Pt educated to continue to increase mobility tolerance. Pt moving well with no safety concerns. From physical therapy stand point pt cleared for discharge. Current Level of Function Impacting Discharge (mobility/balance): Pt requiring mod A for bed mobility with log rolling. Pt ambulating and performed sit to stand transfer at [facility]. Other factors to consider for discharge: Pt with good family support. PLAN :  Patient continues to benefit from skilled intervention to address the above impairments. Continue treatment per established plan of care. to address goals. Recommendation for discharge: (in order for the patient to meet his/her long term goals)  Physical therapy at least 2 days/week in the home AND ensure assist and/or supervision for safety with mobility. This discharge recommendation:  Has been made in collaboration with the attending provider and/or case management    Equipment recommendations for successful discharge (if) home: rolling walker       SUBJECTIVE:   Patient stated  My legs feel good but my back it more sore today.     OBJECTIVE DATA SUMMARY:   Critical Behavior:  Neurologic State: Alert, Appropriate for age  Orientation Level: Oriented X4  Cognition: Appropriate decision making, Appropriate for age attention/concentration, Appropriate safety awareness  Safety/Judgement: Awareness of environment    Spinal diagnosis intervention:  The patient stated 3/3 back precautions when prompted. Reviewed all 3 back precautions, log roll technique, and sitting for 30 minutes at a time. Functional Mobility Training:    Bed Mobility:  Log Rolling: Moderate assistance  Supine to Sit: Moderate assistance  Sit to Supine: Moderate assistance;Minimum assistance  Scooting: Stand-by assistance        Transfers:  Sit to Stand: Contact guard assistance; Additional time  Stand to Sit: Contact guard assistance                             Balance:  Sitting: Intact  Sitting - Static: Good (unsupported)  Sitting - Dynamic: Good (unsupported)  Standing: Intact; With support  Standing - Static: Good;Constant support  Standing - Dynamic : Fair;Good;Constant support  Ambulation/Gait Training:  Distance (ft): 250 Feet (ft)  Assistive Device: Gait belt;Walker, rolling  Ambulation - Level of Assistance: Contact guard assistance;Stand-by assistance        Gait Abnormalities: Decreased step clearance        Base of Support: Widened     Speed/Claudia: Pace decreased (<100 feet/min)  Step Length: Right shortened;Left shortened                    Stairs:  Number of Stairs Trained: 4  Stairs - Level of Assistance: Contact guard assistance   Rail Use: Both    Pain Rating:  Pt reported increased pain with sit to stand transfer. Activity Tolerance:   Good and requires rest breaks but with increased mobility safety and tolerance. Please refer to the flowsheet for vital signs taken during this treatment.     After treatment patient left in no apparent distress:   Supine in bed, Call bell within reach, Caregiver / family present and Side rails x 3    COMMUNICATION/COLLABORATION:   The patients plan of care was discussed with: Registered Nurse    Huber Johnston PTA   Time Calculation: 46 mins

## 2019-10-03 NOTE — PROGRESS NOTES
SHONDA: home with home health     At [facility]. AVS has been updated. CM has acknowledged that patient will need RW. CM has sent referral to [employer] [facility] via [employer]. CM will continue to follow. Update:    [employer] [facility] has approved patient for RW. CM has provided RW to patient. Patient signed 2nd IM letter. Medicare pt has received, reviewed, and signed 2nd IM letter informing them of their right to appeal the discharge. Signed copy has been placed on pt bedside chart. Patient is cleared for d/c from CM standpoint.      Babatunde Marrero, [address] [facility]

## 2019-10-03 NOTE — DISCHARGE SUMMARY
Spine Discharge Summary    Patient ID:  Fredy De La Torre  [id number]  female  74 y.o.  1945    Admit date: 9/30/2019    Discharge date: 10/3/2019    Admitting Physician: Toby Mata MD     Consulting Physician(s):   Treatment Team: Attending Provider: Debbie Carrel, MD; Utilization Review: Fozia Fernando RN; Nurse Practitioner: Rina Alba NP; Care Manager: Samir Kingston    Date of Surgery:   10/1/2019     Preoperative Diagnosis:  LOW BACK PAIN  SPINAL STENOSIS OF LUMBAR  LUMBAR SPONDYLOLSIS    Postoperative Diagnosis:   LOW BACK PAIN  SPINAL STENOSIS OF LUMBAR    Procedure(s):  L2-4 POSTERIOR DECOMPRESSION AND FUSION     Anesthesia Type:   General     Surgeon: Debbie Carrel, MD                            HPI:  Pt is a 74 y.o. female who has a history of LOW BACK PAIN  SPINAL STENOSIS OF LUMBAR  LUMBAR SPONDYLOLSIS  with pain and limitations of activities of daily living who presents at this time for a L2-4 staged lateral and posterior fusion  following the failure of conservative management. PMH:   Past Medical History:   Diagnosis Date    Chronic pain     back/steroid injections    Diabetes ([address]     GERD (gastroesophageal reflux disease)     hiatal hernia    Hypertension     Psychiatric disorder     anxiety/depression       Body mass index is 36.82 kg/m². : A BMI > 30 is classified as obesity and > 40 is classified as morbid obesity. Medications upon admission :   Prior to Admission Medications   Prescriptions Last Dose Informant Patient Reported? Taking?   atorvastatin (LIPITOR) 20 mg tablet 9/29/2019 at Unknown time  Yes Yes   Sig: Take 20 mg by mouth daily. citalopram (CELEXA) 40 mg tablet 9/29/2019 at Unknown time  Yes Yes   Sig: Take 40 mg by mouth every evening.   gabapentin (NEURONTIN) 300 mg capsule 9/30/2019 at 0930  Yes Yes   Sig: Take 300 mg by mouth three (3) times daily.    lisinopril-hydroCHLOROthiazide (PRINZIDE, ZESTORETIC) 10-12.5 mg per tablet 9/29/2019 at Unknown time  Yes Yes   Sig: Take 1 Tab by mouth daily. metFORMIN (GLUCOPHAGE) 500 mg tablet 9/30/2019 at 0930  Yes Yes   Sig: Take  by mouth two (2) times daily (with meals). omeprazole (PRILOSEC OTC) 20 mg tablet 9/28/2019 at Unknown time  Yes Yes   Sig: Take 20 mg by mouth as needed. Facility-Administered Medications: None        Allergies:  No Known Allergies     Hospital Course: The patient underwent surgery. Complications:  None; patient tolerated the procedure well. Was taken to the PACU in stable condition and then transferred to the ortho floor. Perioperative Antibiotics:  Ancef     Postoperative Pain Management:  Oxycodone      Postoperative transfusions:    Number of units banked PRBCs =   none     Post Op complications: none    Hemoglobin at discharge:    Lab Results   Component Value Date/Time    HGB 7.7 (L) 10/02/2019 04:08 AM    INR 1.0 09/23/2019 01:32 PM       Dressing remained intact. Incision - clean, dry and intact. No significant erythema or swelling. Neurovascular exam found to be within normal limits. Wound appears to be healing without any evidence of infection. Physical Therapy started following surgery and participated in bed mobility, transfers and ambulation. Gait:  Gait  Base of Support: Widened  Step Length: Right shortened, Left shortened  Gait Abnormalities: Decreased step clearance  Ambulation - Level of Assistance: Contact guard assistance  Distance (ft): 275 Feet (ft)(200ft and 75ft)  Assistive Device: Gait belt, Walker, rolling  Rail Use: Both  Stairs - Level of Assistance: Contact guard assistance  Number of Stairs Trained: 4                   Discharged to: Home. Condition on Discharge:   stable    Discharge instructions:    - Take pain medications as prescribed  - Resume pre hospital diet      - Discharge activity: activity as tolerated  - Ambulate as tolerated  - Wound Care Keep wound clean and dry. See discharge instruction sheet. -DISCHARGE MEDICATION LIST     Current Discharge Medication List      START taking these medications    Details   acetaminophen (TYLENOL) 500 mg tablet Take 2 Tabs by mouth every six (6) hours for 14 days. Qty: 112 Tab, Refills: 0      oxyCODONE IR (ROXICODONE) 5 mg immediate release tablet Take 1 Tab by mouth every four (4) hours as needed for Pain for up to 14 days. Max Daily Amount: 30 mg.  Qty: 60 Tab, Refills: 0    Associated Diagnoses: S/P lumbar spinal fusion      polyethylene glycol (MIRALAX) 17 gram packet Take 1 Packet by mouth daily for 15 days. Qty: 15 Packet, Refills: 0      senna-docusate (PERICOLACE) 8.6-50 mg per tablet Take 1 Tab by mouth two (2) times a day for 15 days. Qty: 30 Tab, Refills: 0         CONTINUE these medications which have NOT CHANGED    Details   metFORMIN (GLUCOPHAGE) 500 mg tablet Take  by mouth two (2) times daily (with meals). citalopram (CELEXA) 40 mg tablet Take 40 mg by mouth every evening. lisinopril-hydroCHLOROthiazide (PRINZIDE, ZESTORETIC) 10-12.5 mg per tablet Take 1 Tab by mouth daily. gabapentin (NEURONTIN) 300 mg capsule Take 300 mg by mouth three (3) times daily. atorvastatin (LIPITOR) 20 mg tablet Take 20 mg by mouth daily. omeprazole (PRILOSEC OTC) 20 mg tablet Take 20 mg by mouth as needed.           per medical continuation form      -Follow up in office in 2 to 3  weeks      Signed:  Crystal Garduno DNP, AGACNP-BC  Orthopaedic Nurse Practitioner    10/3/2019  11:03 AM

## 2019-10-03 NOTE — PROGRESS NOTES
Problem: Self Care Deficits Care Plan (Adult)  Goal: *Acute Goals and Plan of Care (Insert Text)  Description  FUNCTIONAL STATUS PRIOR TO ADMISSION: Patient was modified independent using a Single point cane for functional mobility. HOME SUPPORT: The patient lived with partner and needed assist due to her pain. Occupational Therapy Goals  Re eval, due to second part of back surgery and goals remain the same  Initiated 10/1/2019    1. Patient will perform lower body dressing with minimal assistance/contact guard assist using [employer] within 7 days. 2.  Patient will perform Bathing with minimal assistance/contact guard assist using most appropriate DME within 7 days. 3.  Patient will toileting at minimal assistance/contact guard assist within 7 days. 4.  Patient will don/doff back brace at minimal assistance/contact guard assist within 7 days. 5.  Patient will verbalize/demonstrate 3/3 back precautions during ADL tasks without cues within 7 days. Outcome: Progressing Towards Goal   OCCUPATIONAL THERAPY TREATMENT  Patient: Jodi Joe (25 y.o. female)  Date: 10/3/2019  Diagnosis: Spinal stenosis of lumbar region with neurogenic claudication [M48.062] S/P lumbar spinal fusion  Procedure(s) (LRB):  L2-4 POSTERIOR DECOMPRESSION AND FUSION (N/A) 2 Days Post-Op  Precautions: Back  Chart, occupational therapy assessment, plan of care, and goals were reviewed. ASSESSMENT  Patient continues with skilled OT services and is progressing towards goals. Pt was seen for 2 visits due to pt needed to rest.  OT instructed pt on how to safely step in an out of tub and she declined to try the transfers and will work on task with home care PT. Pt was issued hip kit and was able to dress LB with min to mod assist.  She needs more assist with left leg and her shoe on left due to weakness.   Pt was insructed on bathing buttocks and cleansing after having a BM    Current Level of Function Impacting Discharge (ADLs): decreased endurance, strength, functional mobility, and ADLs and weakness in left LE             PLAN :  Patient continues to benefit from skilled intervention to address the above impairments. Continue treatment per established plan of care. to address goals. Recommend with staff: OOB for meals     Recommend next OT session: work on Lb ADLs standing     Recommendation for discharge: (in order for the patient to meet his/her long term goals)  No skilled occupational therapy/ follow up rehabilitation needs identified at this time. This discharge recommendation:  Has been made in collaboration with the attending provider and/or case management    Equipment recommendations for successful discharge (if) home: RW       SUBJECTIVE:   Patient stated Alireza Cage will do my best at home. Cristy Barreto    OBJECTIVE DATA SUMMARY:   Cognitive/Behavioral Status:  Neurologic State: Alert  Orientation Level: Oriented X4  Cognition: Follows commands  Perception: Appears intact  Perseveration: No perseveration noted  Safety/Judgement: Awareness of environment    Functional Mobility and Transfers for ADLs:  Bed Mobility:  Rolling: Moderate assistance  Supine to Sit: Moderate assistance  Sit to Supine: Moderate assistance;Minimum assistance  Scooting: Stand-by assistance    Transfers:  Sit to Stand: Contact guard assistance; Additional time  Functional Transfers  Bathroom Mobility: Contact guard assistance  Toilet Transfer : Contact guard assistance  Tub Transfer: (pt was instructe on transfers adn declined to try)       Balance:  Sitting: Intact  Sitting - Static: Good (unsupported)  Sitting - Dynamic: Good (unsupported)  Standing: Intact; With support  Standing - Static: Good;Constant support  Standing - Dynamic : Fair;Good;Constant support    ADL Intervention:  Feeding  Feeding Assistance: Independent    Grooming  Grooming Assistance: Set-up    Upper Body Bathing  Bathing Assistance: Maximum assistance;Minimum assistance    Lower Body Bathing  Bathing Assistance: Maximum assistance;Minimum assistance    Upper Body Dressing Assistance  Dressing Assistance: Set-up    Lower Body Dressing Assistance  Dressing Assistance: Minimum assistance;Contact guard assistance    Toileting  Toileting Assistance: Maximum assistance;Minimum assistance  Bladder Hygiene: Minimum assistance  Bowel Hygiene: Maximum assistance    Cognitive Retraining  Safety/Judgement: Awareness of environment      Activity Tolerance:   Fair  Please refer to the flowsheet for vital signs taken during this treatment.     After treatment patient left in no apparent distress:   Sitting in chair, Call bell within reach and Caregiver / family present    COMMUNICATION/COLLABORATION:   The patients plan of care was discussed with: Physical Therapist, Registered Nurse,  and family     Conchita Fajardo  Time Calculation: 43 mins

## 2019-10-03 NOTE — PROGRESS NOTES
ORTHO POST OP SPINE PROGRESS NOTE    October 3, 2019  Admit Date: 2019  Admit Diagnosis: Spinal stenosis of lumbar region with neurogenic claudication [M48.062]  Procedure: Procedure(s):  L2-4 POSTERIOR DECOMPRESSION AND FUSION  Post Op day: 2 Days Post-Op    Subjective:     Lisa Delgado is a patient who has complaints Of pain in the low back and left hip. She denies pain down the right lower extremity 2 days status post L2 through L4 posterior fusion and 3 days status post L2 through L4 lateral fusion with left-sided approach. She is tolerating p.o. and able to void. She is discouraged given the amount of back pain she is having. Review of Systems: Pertinent items are noted in HPI. Objective:     PT/OT:   Distance Ambulated:           Time Ambulated (min):        Ambulation Response: Activity Response: Tolerated well  Assistive Device:              Assistive Device: Fall prevention device, Walker (comment)    Vital Signs:    Blood pressure 115/55, pulse 69, temperature 98.2 °F (36.8 °C), resp. rate 18, height 5' 1\" (1.549 m), weight 88.4 kg (194 lb 14.2 oz), SpO2 97 %, not currently breastfeeding. Temp (24hrs), Av.3 °F (36.8 °C), Min:98 °F (36.7 °C), Max:98.6 °F (37 °C)      No intake/output data recorded. 10/01 1901 - 10/03 0700  In: -   Out: 700 [Urine:700]    LAB:    Recent Labs     10/02/19  0408   HGB 7.7*       Wound/Drain Assessment:  Drain:      Dressing:     Physical Exam:  Neurological: no deficit  Incision clean, dry, and intact  5/5 strength bilateral lower extremities with the exception of the left hip flexors and knee extensors which are 3/5    Assessment:      Patient Active Problem List   Diagnosis Code    Spinal stenosis of lumbar region with neurogenic claudication M48.062    S/P lumbar spinal fusion Z98.1       Plan:     Continue PT/OT/Rehab  Discontinue: IV  Consult: PT  and OT    Discharge To: Home. Home health.   Today pending progress   she is having left hip pain and low back pain likely due to surgical approach.   Continue work with physical therapy

## 2020-10-05 ENCOUNTER — HOSPITAL ENCOUNTER (OUTPATIENT)
Dept: MAMMOGRAPHY | Age: 75
Discharge: HOME | End: 2020-10-05
Attending: FAMILY MEDICINE
Payer: MEDICARE

## 2020-10-05 DIAGNOSIS — Z12.31 VISIT FOR SCREENING MAMMOGRAM: ICD-10-CM

## 2020-10-05 PROCEDURE — 77063 BREAST TOMOSYNTHESIS BI: CPT

## 2021-06-07 ENCOUNTER — OFFICE VISIT (OUTPATIENT)
Dept: NEUROLOGY | Age: 76
End: 2021-06-07
Payer: MEDICARE

## 2021-06-07 VITALS
SYSTOLIC BLOOD PRESSURE: 136 MMHG | HEIGHT: 61 IN | WEIGHT: 199 LBS | OXYGEN SATURATION: 96 % | HEART RATE: 98 BPM | BODY MASS INDEX: 37.57 KG/M2 | DIASTOLIC BLOOD PRESSURE: 88 MMHG

## 2021-06-07 DIAGNOSIS — R29.898 LEFT LEG WEAKNESS: ICD-10-CM

## 2021-06-07 DIAGNOSIS — R53.81 PHYSICAL DEBILITY: ICD-10-CM

## 2021-06-07 DIAGNOSIS — M51.36 DDD (DEGENERATIVE DISC DISEASE), LUMBAR: ICD-10-CM

## 2021-06-07 DIAGNOSIS — G25.0 TREMOR, ESSENTIAL: Primary | ICD-10-CM

## 2021-06-07 PROCEDURE — 99205 OFFICE O/P NEW HI 60 MIN: CPT | Performed by: PSYCHIATRY & NEUROLOGY

## 2021-06-07 PROCEDURE — 1090F PRES/ABSN URINE INCON ASSESS: CPT | Performed by: PSYCHIATRY & NEUROLOGY

## 2021-06-07 PROCEDURE — 1101F PT FALLS ASSESS-DOCD LE1/YR: CPT | Performed by: PSYCHIATRY & NEUROLOGY

## 2021-06-07 PROCEDURE — G8536 NO DOC ELDER MAL SCRN: HCPCS | Performed by: PSYCHIATRY & NEUROLOGY

## 2021-06-07 PROCEDURE — G8427 DOCREV CUR MEDS BY ELIG CLIN: HCPCS | Performed by: PSYCHIATRY & NEUROLOGY

## 2021-06-07 PROCEDURE — G8432 DEP SCR NOT DOC, RNG: HCPCS | Performed by: PSYCHIATRY & NEUROLOGY

## 2021-06-07 PROCEDURE — G8399 PT W/DXA RESULTS DOCUMENT: HCPCS | Performed by: PSYCHIATRY & NEUROLOGY

## 2021-06-07 PROCEDURE — G8417 CALC BMI ABV UP PARAM F/U: HCPCS | Performed by: PSYCHIATRY & NEUROLOGY

## 2021-06-07 RX ORDER — CYCLOBENZAPRINE HCL 5 MG
5 TABLET ORAL
COMMUNITY
Start: 2021-05-28 | End: 2021-11-08

## 2021-06-07 RX ORDER — HYDROCHLOROTHIAZIDE 12.5 MG/1
TABLET ORAL
COMMUNITY
Start: 2021-05-04 | End: 2021-11-08

## 2021-06-07 NOTE — PROGRESS NOTES
Chief Complaint   Patient presents with    Neurologic Problem     \" i have been experiencing some shaking and it is getting worse, at least for  the last 6 months. \"       Referred by: Dr Isidro DAMIAN    Ms. Lucinda Davis is a 38-year-old woman with a history of diabetes, severe lumbosacral spine disease, high cholesterol, high blood pressure here for shaking and tremor. I spoke with her personally as well as her spouse in the room. I reviewed the medical record. I reviewed neuroimaging completed of the spine in the past.  She is here because in the past 2 years she has had a tremor that in the past 8 months has worsened affecting her arms legs whole body sometimes her head. Tremor can fluctuate in severity and sometimes can be very mild. In the last 2 weeks the tremor is worse in the setting of an exacerbation of her low back pain. She has a history of severe spinal stenosis prompting surgery. Since her surgery in 2019 she has had difficulty walking independently and uses a walker. During Covid, she also became extremely isolated with increasing debility due to lack of physical activity. She denies any hallucinations or constipation or drooling. No falls. Handwriting is affected from her tremor. Sometimes she acts out in her sleep. She has a history of Ménière's. She has depression long-term getting worse due to debility. She also is very fearful of falling feeling like she is imbalanced. Review of Systems   Constitutional: Positive for malaise/fatigue. Gastrointestinal: Negative for constipation. Neurological: Positive for tremors and weakness. Psychiatric/Behavioral: Positive for depression. Negative for hallucinations and suicidal ideas. The patient is not nervous/anxious. All other systems reviewed and are negative.       Past Medical History:   Diagnosis Date    Chronic pain     back/steroid injections    Diabetes ([address]     GERD (gastroesophageal reflux disease)     hiatal hernia    Hypertension     Menopause     Psychiatric disorder     anxiety/depression     Family History   Problem Relation Age of Onset    Heart Disease Mother         CHF    Alcohol abuse Father     Hypertension Father     Diabetes Father     Cancer Sister         breast    Breast Cancer Sister      Social History     Socioeconomic History    Marital status:      Spouse name: Not on file    Number of children: Not on file    Years of education: Not on file    Highest education level: Not on file   Occupational History    Not on file   Tobacco Use    Smoking status: Never Smoker    Smokeless tobacco: Never Used   Vaping Use    Vaping Use: Never used   Substance and Sexual Activity    Alcohol use: Never    Drug use: Never    Sexual activity: Not on file   Other Topics Concern    Not on file   Social History Narrative    Not on file     Social Determinants of Health     Financial Resource Strain:     Difficulty of Paying Living Expenses:    Food Insecurity:     Worried About Running Out of Food in the Last Year:     [address] in the Last Year:    Transportation Needs:     Lack of Transportation (Medical):  Lack of Transportation (Non-Medical):    Physical Activity:     Days of Exercise per Week:     Minutes of Exercise per Session:    Stress:     Feeling of Stress :    Social Connections:     Frequency of Communication with Friends and Family:     Frequency of Social Gatherings with Friends and Family:     Attends [employer] Services:     Active Member of Clubs or Organizations:     Attends Club or Organization Meetings:     Marital Status:    Intimate Partner Violence:     Fear of Current or Ex-Partner:     Emotionally Abused:     Physically Abused:     Sexually Abused:      Current Outpatient Medications   Medication Sig    cyclobenzaprine (FLEXERIL) 5 mg tablet Take 5 mg by mouth three (3) times daily as needed.     hydroCHLOROthiazide (HYDRODIURIL) 12.5 mg tablet     metFORMIN (GLUCOPHAGE) 500 mg tablet Take  by mouth two (2) times daily (with meals).  citalopram (CELEXA) 40 mg tablet Take 40 mg by mouth every evening.  lisinopril-hydroCHLOROthiazide (PRINZIDE, ZESTORETIC) 10-12.5 mg per tablet Take 1 Tab by mouth daily.  gabapentin (NEURONTIN) 300 mg capsule Take 300 mg by mouth three (3) times daily.  atorvastatin (LIPITOR) 20 mg tablet Take 20 mg by mouth daily.  omeprazole (PRILOSEC OTC) 20 mg tablet Take 20 mg by mouth as needed. No current facility-administered medications for this visit. No Known Allergies      Neurologic Exam     Mental Status   Oriented to person, place, and time. Cranial Nerves   Cranial nerves II through XII intact. Slight head tremor noted     Motor Exam Bilateral lower extremities right leg 4+ left leg 4 and strength with some atrophy of the proximal left leg     Sensory Exam   Light touch normal.     Gait, Coordination, and Reflexes     Gait  Gait: (Slow antalgic nonshuffling with a walker)Trace reflexes    During exam she has bilateral upper extremity small amplitude moderate frequency tremor that fluctuates during the visit sometimes abating and sometimes severe     Physical Exam  Vitals and nursing note reviewed. Neurological:      Mental Status: She is oriented to person, place, and time. Visit Vitals  /88 (BP 1 Location: Left upper arm, BP Patient Position: Sitting)   Pulse 98   Ht 5' 1\" (1.549 m)   Wt 199 lb (90.3 kg)   SpO2 96%   BMI 37.60 kg/m²       Lab Results   Component Value Date/Time    WBC 7.6 09/23/2019 01:32 PM    HGB 7.7 (L) 10/02/2019 04:08 AM    HCT 35.7 09/23/2019 01:32 PM    PLATELET 316 (H) 44/97/6563 01:32 PM    MCV 81.0 09/23/2019 01:32 PM     Lab Results   Component Value Date/Time    ALT (SGPT) 22 09/23/2019 01:32 PM    Alk.  phosphatase 84 09/23/2019 01:32 PM    Bilirubin, total 0.4 09/23/2019 01:32 PM    Albumin 3.9 09/23/2019 01:32 PM    Protein, total 7.5 09/23/2019 01:32 PM INR 1.0 09/23/2019 01:32 PM    Prothrombin time 10.5 09/23/2019 01:32 PM    PLATELET 449 (H) 30/04/0036 01:32 PM        CT Results (maximum last 3): Results from [place] encounter on 09/30/19    CT SPINE LUMB WO CONT    Narrative  *PRELIMINARY REPORT*    Exam: [facility] protocol lumbar spine CT    Preliminary findings: Status post L2-4 lateral fusion with intervertebral disc  devices at L2-3 and L3-4. Normal bony alignment. Hardware apparently intact. Preliminary report was provided by Dr. Mai Blackburn, the on-call radiologist, at  2251 hours    Final report to follow. *END PRELIMINARY REPORT*    INDICATION:  Follow up lumbar spine fusion    EXAM: CT lumbar spine. Intraoperative films same day and 6/19/2019. Thin section axial images were obtained. From these sagittal and coronal  reformats were performed. CT dose reduction was achieved through use of a  standardized protocol tailored for this examination and automatic exposure  control for dose modulation. FINDINGS: There is been lateral interbody fusion at L2-3 and L3-4. The L2-3  interbody fusion is positioned centrally within the disc space. There is a  sagittally oriented fracture through the right lateral margin of the L2  vertebral body best seen on coronal image 30. The interbody fusion at L3-4 is  positioned centrally within the disc space. There is a chronic superior endplate  fracture of L4 present on the previous MRI. There is bibasilar atelectasis. Patient has a small hiatal hernia. There is  retroperitoneal gas within on the left, postoperative. There are mild vascular  calcifications. There are round low-density lesions within the liver which are  incompletely evaluated but may represent cysts    Impression  IMPRESSION:  1. Postoperative changes at L2-3 and L3-4 with fracture of the far right lateral  margin of the L2 vertebral body      MRI Results (maximum last 3):   Results from [place] encounter on 06/19/19    MRI LUMB SPINE WO CONT    Narrative  EXAM:  MRI LUMB SPINE WO CONT    INDICATION:   Pseudoclaudication syndrome    COMPARISON: None. TECHNIQUE: Multiplanar multisequence acquisition without contrast of the lumbar  spine. CONTRAST: None. FINDINGS:  The last well-formed disk is designated as L5-S1 for the purpose of this report. Vertebral bodies were numbered using this convention. Mild retrolisthesis of L2 on L3. Alignment is otherwise within normal limits. No  evidence of acute fracture. Chronic anterior compression fracture of the L4  vertebral body with approximately 20% height loss and no retropulsion. Degenerative endplate marrow changes noted at L2-L3. Marrow signal is otherwise  heterogeneous, but without suspicious focal osseous lesion. Multilevel  degenerative disc disease and facet arthropathy as detailed below, most  significant at L3-L4. The conus is normal in size and signal and terminates at  L1-L2. The cauda equina is unremarkable, other than redundancy due to spinal  canal stenoses. Multiple simple hepatic cysts are noted, largest measuring 9.2  cm. T12-L1: No significant disc herniation, spinal canal or neural foraminal  stenosis. L1-L2: No significant disc herniation, spinal canal or neural foraminal  stenosis. L2-L3: Retrolisthesis. Diffuse disc bulge, eccentric to the right. Mild  bilateral facet arthropathy. Mild spinal canal stenosis. Severe right and no  left neural foraminal stenosis. L3-L4: Diffuse disc bulge, eccentric to the left. Severe right and mild left  facet arthropathy. Severe spinal canal stenosis and severe left lateral recess  stenosis. Mild bilateral neural foraminal stenosis. L4-L5: Mild diffuse disc bulge, eccentric to the left. Severe bilateral facet  arthropathy. No significant spinal canal stenosis. Mild to moderate left and  mild right neural foraminal stenosis. L5-S1: Diffuse disc bulge, eccentric to the left.  Severe bilateral facet  arthropathy. No significant spinal canal stenosis. Moderate left and mild to  moderate right neural foraminal stenosis. Impression  IMPRESSION:  1. Severe spinal canal stenosis and severe left lateral recess stenosis at  L3-L4. 2. Mild spinal canal stenosis and severe right neural foraminal stenosis at  L2-L3. Mild retrolisthesis of L2 on L3.  3. Remaining degenerative changes as detailed above. 4. Chronic anterior compression deformity of L4 with mild height loss. 23X      PET Results (maximum last 3): No results found for this or any previous visit. Assessment and Plan   Diagnoses and all orders for this visit:    1. Tremor, essential    2. Left leg weakness    3. Physical debility    4. DDD (degenerative disc disease), lumbar      38-year-old woman who has what seems to be a likely essential tremor getting worse in the setting of overall physical debility and pain. Not hearing any significant red flags for Parkinson's disease. When I get her to relax enough I am not appreciating any cogwheel either. Her gait is certainly poor but I think due to her lumbosacral spine disease and overall weakness. I also think she might have a mild diabetic neuropathy as well contributing to a sense of imbalance. She does have bilateral leg weakness chronically more so on the left. I think I would observe and follow clinically for now. Get her pain control improved for her back. Course of therapy would be ideal to get her stronger. I offered her medication but she declined at this time which I think is reasonable and we can revisit that in follow-up. I feel reassured at this time but I do not think we need any escalated neuroimaging. I think her current leg weakness is chronic in the setting of lumbosacral spine disease.   60 minutes of time was spent reviewing the medical record, to include extensive face-to-face time and examination with collateral history from her significant other, completion of documentation. I reviewed and decided to continue the current medications. This clinical note was dictated with an electronic dictation software that can make unintentional errors. If there are any questions, please contact me directly for clarification. A notice of this visit/encounter being completed has been sent electronically to the patient's PCP and/or referring provider.      [address] [place] [address], [facility] [address]  Diplomate ABPN

## 2021-06-07 NOTE — LETTER
6/7/2021 Patient: Jaun Martin  
YOB: 1945 Date of Visit: 6/7/2021 Jamin Saez DO 
2 [facility] 100 [facility] 7 [phone] Via Fax: [phone] Dear Jamin Saez DO, Thank you for referring Ms. Jaun Martin to [address] [place] [address] for evaluation. My notes for this consultation are attached. If you have questions, please do not hesitate to call me. I look forward to following your patient along with you. Sincerely, [address] [facility],  
 
6/7/2021 Patient:  Jaun Martin  
YOB: 1945 Date of Visit: 6/7/2021 Dear Jamin Saez DO 
[address] [place] [address] 100 [facility] 7 [address] Via Fax: [phone]: I was requested by Sima Kee DO to evaluate Ms. Jaun Martin  for Chief Complaint Patient presents with  Neurologic Problem \" i have been experiencing some shaking and it is getting worse, at least for  the last 6 months. \" Katy Lobo I am recommending the following:  
 
Diagnoses and all orders for this visit: 1. Tremor, essential 
 
2. Left leg weakness 3. Physical debility 4. DDD (degenerative disc disease), lumbar 
 
 
 
---------------------------------------------------------------------------------------------------------------------- Below is my encounter: Chief Complaint Patient presents with  Neurologic Problem \" i have been experiencing some shaking and it is getting worse, at least for  the last 6 months. \"  
 
 
Referred by: Dr Walt Espinoza HPI Ms. Pitts is a 64-year-old woman with a history of diabetes, severe lumbosacral spine disease, high cholesterol, high blood pressure here for shaking and tremor. I spoke with her personally as well as her spouse in the room. I reviewed the medical record.   I reviewed neuroimaging completed of the spine in the past.  She is here because in the past 2 years she has had a tremor that in the past 8 months has worsened affecting her arms legs whole body sometimes her head. Tremor can fluctuate in severity and sometimes can be very mild. In the last 2 weeks the tremor is worse in the setting of an exacerbation of her low back pain. She has a history of severe spinal stenosis prompting surgery. Since her surgery in 2019 she has had difficulty walking independently and uses a walker. During Covid, she also became extremely isolated with increasing debility due to lack of physical activity. She denies any hallucinations or constipation or drooling. No falls. Handwriting is affected from her tremor. Sometimes she acts out in her sleep. She has a history of Ménière's. She has depression long-term getting worse due to debility. She also is very fearful of falling feeling like she is imbalanced. Review of Systems Constitutional: Positive for malaise/fatigue. Gastrointestinal: Negative for constipation. Neurological: Positive for tremors and weakness. Psychiatric/Behavioral: Positive for depression. Negative for hallucinations and suicidal ideas. The patient is not nervous/anxious. All other systems reviewed and are negative. Past Medical History:  
Diagnosis Date  Chronic pain   
 back/steroid injections  Diabetes ([address]  GERD (gastroesophageal reflux disease)   
 hiatal hernia  Hypertension  Menopause  Psychiatric disorder   
 anxiety/depression Family History Problem Relation Age of Onset  Heart Disease Mother CHF  Alcohol abuse Father  Hypertension Father  Diabetes Father  Cancer Sister   
     breast  
 Breast Cancer Sister Social History Socioeconomic History  Marital status:  Spouse name: Not on file  Number of children: Not on file  Years of education: Not on file  Highest education level: Not on file Occupational History  Not on file Tobacco Use  Smoking status: Never Smoker  Smokeless tobacco: Never Used Vaping Use  Vaping Use: Never used Substance and Sexual Activity  Alcohol use: Never  Drug use: Never  Sexual activity: Not on file Other Topics Concern  Not on file Social History Narrative  Not on file Social Determinants of Health Financial Resource Strain:  Difficulty of Paying Living Expenses:   
Food Insecurity:  Worried About [address] in the Last Year:   
[address] in the Last Year:   
Transportation Needs:   
 Lack of Transportation (Medical):  Lack of Transportation (Non-Medical): Physical Activity:   
 Days of Exercise per Week:  Minutes of Exercise per Session:   
Stress:  Feeling of Stress :   
Social Connections:  Frequency of Communication with Friends and Family:  Frequency of Social Gatherings with Friends and Family:  Attends Islam Services:  Active Member of Clubs or Organizations:  Attends Club or Organization Meetings:  Marital Status: Intimate Partner Violence:  Fear of Current or Ex-Partner:  Emotionally Abused:   
 Physically Abused:   
 Sexually Abused:   
 
Current Outpatient Medications Medication Sig  cyclobenzaprine (FLEXERIL) 5 mg tablet Take 5 mg by mouth three (3) times daily as needed.  hydroCHLOROthiazide (HYDRODIURIL) 12.5 mg tablet  metFORMIN (GLUCOPHAGE) 500 mg tablet Take  by mouth two (2) times daily (with meals).  citalopram (CELEXA) 40 mg tablet Take 40 mg by mouth every evening.  lisinopril-hydroCHLOROthiazide (PRINZIDE, ZESTORETIC) 10-12.5 mg per tablet Take 1 Tab by mouth daily.  gabapentin (NEURONTIN) 300 mg capsule Take 300 mg by mouth three (3) times daily.  atorvastatin (LIPITOR) 20 mg tablet Take 20 mg by mouth daily.  omeprazole (PRILOSEC OTC) 20 mg tablet Take 20 mg by mouth as needed. No current facility-administered medications for this visit. No Known Allergies Neurologic Exam  
 
Mental Status Oriented to person, place, and time. Cranial Nerves Cranial nerves II through XII intact. Slight head tremor noted Motor Exam Bilateral lower extremities right leg 4+ left leg 4 and strength with some atrophy of the proximal left leg Sensory Exam  
Light touch normal.  
 
Gait, Coordination, and Reflexes Gait Gait: (Slow antalgic nonshuffling with a walker)Trace reflexes During exam she has bilateral upper extremity small amplitude moderate frequency tremor that fluctuates during the visit sometimes abating and sometimes severe Physical Exam 
Vitals and nursing note reviewed. Neurological:  
   Mental Status: She is oriented to person, place, and time. Visit Vitals /88 (BP 1 Location: Left upper arm, BP Patient Position: Sitting) Pulse 98 Ht 5' 1\" (1.549 m) Wt 199 lb (90.3 kg) SpO2 96% BMI 37.60 kg/m² Lab Results Component Value Date/Time WBC 7.6 09/23/2019 01:32 PM  
 HGB 7.7 (L) 10/02/2019 04:08 AM  
 HCT 35.7 09/23/2019 01:32 PM  
 PLATELET 939 (H) 06/86/3423 01:32 PM  
 MCV 81.0 09/23/2019 01:32 PM  
 
Lab Results Component Value Date/Time ALT (SGPT) 22 09/23/2019 01:32 PM  
 Alk. phosphatase 84 09/23/2019 01:32 PM  
 Bilirubin, total 0.4 09/23/2019 01:32 PM  
 Albumin 3.9 09/23/2019 01:32 PM  
 Protein, total 7.5 09/23/2019 01:32 PM  
 INR 1.0 09/23/2019 01:32 PM  
 Prothrombin time 10.5 09/23/2019 01:32 PM  
 PLATELET 951 (H) 79/68/0332 01:32 PM  
  
 
CT Results (maximum last 3): Results from Hospital Encounter encounter on 09/30/19 CT SPINE LUMB WO CONT Narrative *PRELIMINARY REPORT* Exam: [facility] protocol lumbar spine CT Preliminary findings: Status post L2-4 lateral fusion with intervertebral disc 
devices at L2-3 and L3-4. Normal bony alignment. Hardware apparently intact. Preliminary report was provided by Dr. Christina Zhang, the on-call radiologist, at 
2251 hours Final report to follow.  
 
*END PRELIMINARY REPORT* INDICATION:  Follow up lumbar spine fusion EXAM: CT lumbar spine. Intraoperative films same day and 6/19/2019. Thin section axial images were obtained. From these sagittal and coronal 
reformats were performed. CT dose reduction was achieved through use of a 
standardized protocol tailored for this examination and automatic exposure 
control for dose modulation. FINDINGS: There is been lateral interbody fusion at L2-3 and L3-4. The L2-3 
interbody fusion is positioned centrally within the disc space. There is a 
sagittally oriented fracture through the right lateral margin of the L2 
vertebral body best seen on coronal image 30. The interbody fusion at L3-4 is 
positioned centrally within the disc space. There is a chronic superior endplate 
fracture of L4 present on the previous MRI. There is bibasilar atelectasis. Patient has a small hiatal hernia. There is 
retroperitoneal gas within on the left, postoperative. There are mild vascular 
calcifications. There are round low-density lesions within the liver which are 
incompletely evaluated but may represent cysts Impression IMPRESSION: 
1. Postoperative changes at L2-3 and L3-4 with fracture of the far right lateral 
margin of the L2 vertebral body MRI Results (maximum last 3): Results from Hospital Encounter encounter on 06/19/19 MRI LUMB SPINE WO CONT Narrative EXAM:  MRI LUMB SPINE WO CONT INDICATION:   Pseudoclaudication syndrome COMPARISON: None. TECHNIQUE: Multiplanar multisequence acquisition without contrast of the lumbar 
spine. CONTRAST: None. FINDINGS: 
The last well-formed disk is designated as L5-S1 for the purpose of this report. Vertebral bodies were numbered using this convention. Mild retrolisthesis of L2 on L3. Alignment is otherwise within normal limits. No 
evidence of acute fracture.  Chronic anterior compression fracture of the L4 
vertebral body with approximately 20% height loss and no retropulsion. Degenerative endplate marrow changes noted at L2-L3. Marrow signal is otherwise 
heterogeneous, but without suspicious focal osseous lesion. Multilevel 
degenerative disc disease and facet arthropathy as detailed below, most 
significant at L3-L4. The conus is normal in size and signal and terminates at 
L1-L2. The cauda equina is unremarkable, other than redundancy due to spinal 
canal stenoses. Multiple simple hepatic cysts are noted, largest measuring 9.2 
cm. T12-L1: No significant disc herniation, spinal canal or neural foraminal 
stenosis. L1-L2: No significant disc herniation, spinal canal or neural foraminal 
stenosis. L2-L3: Retrolisthesis. Diffuse disc bulge, eccentric to the right. Mild 
bilateral facet arthropathy. Mild spinal canal stenosis. Severe right and no 
left neural foraminal stenosis. L3-L4: Diffuse disc bulge, eccentric to the left. Severe right and mild left 
facet arthropathy. Severe spinal canal stenosis and severe left lateral recess 
stenosis. Mild bilateral neural foraminal stenosis. L4-L5: Mild diffuse disc bulge, eccentric to the left. Severe bilateral facet 
arthropathy. No significant spinal canal stenosis. Mild to moderate left and 
mild right neural foraminal stenosis. L5-S1: Diffuse disc bulge, eccentric to the left. Severe bilateral facet 
arthropathy. No significant spinal canal stenosis. Moderate left and mild to 
moderate right neural foraminal stenosis. Impression IMPRESSION: 
1. Severe spinal canal stenosis and severe left lateral recess stenosis at L3-L4. 2. Mild spinal canal stenosis and severe right neural foraminal stenosis at 
L2-L3. Mild retrolisthesis of L2 on L3. 
3. Remaining degenerative changes as detailed above. 4. Chronic anterior compression deformity of L4 with mild height loss. 23X PET Results (maximum last 3): No results found for this or any previous visit. Assessment and Plan Diagnoses and all orders for this visit: 1. Tremor, essential 
 
2. Left leg weakness 3. Physical debility 4. DDD (degenerative disc disease), lumbar 68-year-old woman who has what seems to be a likely essential tremor getting worse in the setting of overall physical debility and pain. Not hearing any significant red flags for Parkinson's disease. When I get her to relax enough I am not appreciating any cogwheel either. Her gait is certainly poor but I think due to her lumbosacral spine disease and overall weakness. I also think she might have a mild diabetic neuropathy as well contributing to a sense of imbalance. She does have bilateral leg weakness chronically more so on the left. I think I would observe and follow clinically for now. Get her pain control improved for her back. Course of therapy would be ideal to get her stronger. I offered her medication but she declined at this time which I think is reasonable and we can revisit that in follow-up. I feel reassured at this time but I do not think we need any escalated neuroimaging. I think her current leg weakness is chronic in the setting of lumbosacral spine disease. 60 minutes of time was spent reviewing the medical record, to include extensive face-to-face time and examination with collateral history from her significant other, completion of documentation. I reviewed and decided to continue the current medications. This clinical note was dictated with an electronic dictation software that can make unintentional errors. If there are any questions, please contact me directly for clarification. Thank you for giving me the opportunity to assist in the care of Ms. Wale Rajan.  If you have questions, please do not hesitate to contact me. Sincerely, [facility], DO Neurologist 
Brain Injury Medicine Diplomate ABPN

## 2021-06-24 ENCOUNTER — TRANSCRIBE ORDER (OUTPATIENT)
Dept: SCHEDULING | Age: 76
End: 2021-06-24

## 2021-06-24 DIAGNOSIS — M51.36 DEGENERATIVE DISC DISEASE, LUMBAR: Primary | ICD-10-CM

## 2021-06-24 DIAGNOSIS — M54.50 LOW BACK PAIN, UNSPECIFIED BACK PAIN LATERALITY, UNSPECIFIED CHRONICITY, UNSPECIFIED WHETHER SCIATICA PRESENT: ICD-10-CM

## 2021-06-24 DIAGNOSIS — M48.061 SPINAL STENOSIS, LUMBAR REGION, WITHOUT NEUROGENIC CLAUDICATION: ICD-10-CM

## 2021-06-24 DIAGNOSIS — M47.816 LUMBAR SPONDYLOSIS: ICD-10-CM

## 2021-06-24 DIAGNOSIS — M48.062 SPINAL STENOSIS OF LUMBAR REGION WITH NEUROGENIC CLAUDICATION: ICD-10-CM

## 2021-06-24 DIAGNOSIS — M21.371 RIGHT FOOT DROP: ICD-10-CM

## 2021-07-08 ENCOUNTER — HOSPITAL ENCOUNTER (OUTPATIENT)
Dept: MRI IMAGING | Age: 76
Discharge: HOME | End: 2021-07-08
Attending: ORTHOPAEDIC SURGERY
Payer: MEDICARE

## 2021-07-08 DIAGNOSIS — M47.816 LUMBAR SPONDYLOSIS: ICD-10-CM

## 2021-07-08 DIAGNOSIS — M51.36 DEGENERATIVE DISC DISEASE, LUMBAR: ICD-10-CM

## 2021-07-08 DIAGNOSIS — M21.371 RIGHT FOOT DROP: ICD-10-CM

## 2021-07-08 DIAGNOSIS — M48.061 SPINAL STENOSIS, LUMBAR REGION, WITHOUT NEUROGENIC CLAUDICATION: ICD-10-CM

## 2021-07-08 DIAGNOSIS — M48.062 SPINAL STENOSIS OF LUMBAR REGION WITH NEUROGENIC CLAUDICATION: ICD-10-CM

## 2021-07-08 DIAGNOSIS — M54.50 LOW BACK PAIN, UNSPECIFIED BACK PAIN LATERALITY, UNSPECIFIED CHRONICITY, UNSPECIFIED WHETHER SCIATICA PRESENT: ICD-10-CM

## 2021-07-08 PROCEDURE — 74011636320 HC RX REV CODE- 636/320: Performed by: ORTHOPAEDIC SURGERY

## 2021-07-08 PROCEDURE — A9576 INJ PROHANCE MULTIPACK: HCPCS | Performed by: ORTHOPAEDIC SURGERY

## 2021-07-08 PROCEDURE — 72158 MRI LUMBAR SPINE W/O & W/DYE: CPT

## 2021-07-08 RX ADMIN — GADOTERIDOL 19 ML: 279.3 INJECTION, SOLUTION INTRAVENOUS at 17:13

## 2021-08-10 ENCOUNTER — HOSPITAL ENCOUNTER (OUTPATIENT)
Dept: PHYSICAL THERAPY | Age: 76
Discharge: HOME | End: 2021-08-10
Payer: MEDICARE

## 2021-08-10 PROCEDURE — 97110 THERAPEUTIC EXERCISES: CPT | Performed by: PHYSICAL THERAPIST

## 2021-08-10 PROCEDURE — 97163 PT EVAL HIGH COMPLEX 45 MIN: CPT | Performed by: PHYSICAL THERAPIST

## 2021-08-10 NOTE — PROGRESS NOTES
PT INITIAL EVALUATION NOTE - [facility] 2-15    Patient Name: Cristal Castelan  Date:8/10/2021  : 1945  [x]  Patient  Verified  Payor: Isaac Hernández / Plan: [place] MED[place] & B / Product Type: Medicare /    In time: 5135  Out time: 100  Total Treatment Time (min): 45  Total Timed Codes (min): 12  1:1 Treatment Time ( only): 12   Visit #: 1     Treatment Area: Low back pain [M54.5]    SUBJECTIVE  Pain Level (0-10 scale): 2 sitting, 3 walking, 9-10 lying supine  Any medication changes, allergies to medications, adverse drug reactions, diagnosis change, or new procedure performed?: [] No    [x] Yes (see summary sheet for update)  Subjective:    Pt reports that she had fusion at L3-4 in 2018. She completed therapy following this. She started going back to the gym and then [employer] hit and she was stuck at home. Since then she has stayed at home and not exercised. She reports that she has gained weight as well.  3 months ago she had acute onset of pain in the right low back/buttock with pain down to her foot. She went to ED and x-rays were negative. She followed up with her surgeon and has now been sent to PT. Recent MRI shows the following  1. Status post surgical decompression at L2-3 and L3-4 since 2019.  2. L5-S1 degenerative changes with moderate to severe right foraminal stenosis  which has progressed since the preoperative study of 2019. 3. No change in milder degenerative changes at L1-2 and L4-5. She reports that she is sleeping in the recliner because she has onset of pain in the right leg after 15 minutes of lying in bed. She denies numbness or tingling. She reports no giving out but states that she will fall if she doesn't use her walker. She does state that prior to this episode she was only using a cane. In  she had Meniere's disease and states that for treatment they cut her nerve which caused her to completely lose her hearing on the left side. She lives at home with her partner.   She has 3 steps to enter the home and a bedroom on second floor. Indoor stairs are more problematic. OBJECTIVE/EXAMINATION    OBJECTIVE    Posture:  Seated increased forward head and thoracic kyphosis  Other Observations:   In discomfort  Gait and Functional Mobility:  antalgic, left hip drop in right stance, right toe out  Using rolling walker  Palpation: NT    Lumbar AROM: Difficult to assess secondary to discomfort and difficulty getting transferring          [address]       R  L  0 - No Contraction  L1, L2 Psoas  4  4  1 - Trace   L3 Quads  4  4  2 - Poor   L4 Tib Ant  4  4  3 - Fair    L5 EHL  NT  NT  4 - Good   S1 Peroneals  NT  NT  5 - Normal   S2 Hams  4  4    Flexibility: decreased hamstring, gastroc  Mobility Assessment: decreased mobility throughout the spine and hips likely secondary to being sedentary and seated most of the day      MMT:               HIP ER: 3              HIP Abd: 3  Neurological: Reflexes / Sensations: grossly intact  Special Tests:    Trendelenberg: +    FABERS: +   Forward Bend: +    Slump: +   H.S. SLR: +     Piriformis Ext: +   Long Sit: NT     Lumbar Distraction: NT   SI Compression/Distraction: NT      12 min Therapeutic Exercise:  [x] See flow sheet :   Rationale: increase ROM, increase strength and improve coordination to improve the patients ability to complete all activity    With   [x] TE   [] TA   [] Neuro   [] SC   [] other: Patient Education: [x] Review HEP    [x] Progressed/Changed HEP based on:   [x] positioning   [x] body mechanics   [x] transfers   [] heat/ice application    [] other:      Other Objective/Functional Measures: FOTO Functional Measure: 37/100                         Pain Level (0-10 scale) post treatment: 2-3    ASSESSMENT/Changes in Function:     [x]  See Plan of Elan, PT 8/10/2021

## 2021-08-13 ENCOUNTER — HOSPITAL ENCOUNTER (OUTPATIENT)
Dept: PHYSICAL THERAPY | Age: 76
Discharge: HOME | End: 2021-08-13
Payer: MEDICARE

## 2021-08-13 PROCEDURE — 97110 THERAPEUTIC EXERCISES: CPT | Performed by: PHYSICAL THERAPIST

## 2021-08-18 ENCOUNTER — HOSPITAL ENCOUNTER (OUTPATIENT)
Dept: PHYSICAL THERAPY | Age: 76
Discharge: HOME | End: 2021-08-18
Payer: MEDICARE

## 2021-08-18 PROCEDURE — 97110 THERAPEUTIC EXERCISES: CPT

## 2021-08-20 ENCOUNTER — APPOINTMENT (OUTPATIENT)
Dept: PHYSICAL THERAPY | Age: 76
End: 2021-08-20
Payer: MEDICARE

## 2021-08-25 ENCOUNTER — HOSPITAL ENCOUNTER (OUTPATIENT)
Dept: PHYSICAL THERAPY | Age: 76
Discharge: HOME | End: 2021-08-25
Payer: MEDICARE

## 2021-08-25 PROCEDURE — 97110 THERAPEUTIC EXERCISES: CPT | Performed by: PHYSICAL THERAPIST

## 2021-08-25 NOTE — PROGRESS NOTES
PT DAILY TREATMENT NOTE - [facility] 2-15    Patient Name: Adalid Cerna  Date:2021  : 1945  [x]  Patient  Verified  Payor: [place] MEDICARE / Plan: [place] MEDICARE PART A & B / Product Type: Medicare /    In time:103  Out time:149  Total Treatment Time (min): 46  Total Timed Codes (min): 46  1:1 Treatment Time ( [address] only): 55  Visit #:  4    Treatment Area: Low back pain [M54.5]    SUBJECTIVE  Pain Level (0-10 scale): 3/10  Any medication changes, allergies to medications, adverse drug reactions, diagnosis change, or new procedure performed?: [x] No    [] Yes (see summary sheet for update)  Subjective functional status/changes:   [] No changes reported  Pt reports that she is a little better than when she began but had a recent flare up. She states that she hasn't done a great job with her HEP. OBJECTIVE    46 min Therapeutic Exercise:  [x] See flow sheet :   Rationale: increase ROM and increase strength to improve the patients ability to perform ADLs and reduce pain levels    With   [] TE   [] TA   [] Neuro   [] SC   [] other: Patient Education: [x] Review HEP    [] Progressed/Changed HEP based on:   [] positioning   [] body mechanics   [] transfers   [] heat/ice application    [] other:      Other Objective/Functional Measures: none noted     Pain Level (0-10 scale) post treatment: 2/10    ASSESSMENT/Changes in Function:   Pt is able to complete all activity. She does seem to be moving with greater ease today but is still very concerned that she will fall especially off the treatment table. With some discussion she is agreeable to try mat walking  And even ambulated 50ft with spc.   Will continue as able    Patient will continue to benefit from skilled PT services to modify and progress therapeutic interventions, address functional mobility deficits, address ROM deficits, address strength deficits, analyze and address soft tissue restrictions, analyze and cue movement patterns, analyze and modify body mechanics/ergonomics and assess and modify postural abnormalities to attain remaining goals. [x]  See Plan of Care  []  See progress note/recertification  []  See Discharge Summary         Progress towards goals / Updated goals:  Patient is progressing towards goals.      PLAN  [x]  Upgrade activities as tolerated     [x]  Continue plan of care  [x]  Update interventions per flow sheet       []  Discharge due to:_  []  Other:_      Priya Martinez, PT 8/25/2021

## 2021-08-27 ENCOUNTER — HOSPITAL ENCOUNTER (OUTPATIENT)
Dept: PHYSICAL THERAPY | Age: 76
Discharge: HOME | End: 2021-08-27
Payer: MEDICARE

## 2021-08-27 PROCEDURE — 97110 THERAPEUTIC EXERCISES: CPT

## 2021-08-27 NOTE — PROGRESS NOTES
PT DAILY TREATMENT NOTE - [facility] 2-15    Patient Name: Cristal Castelan  Date:2021  : 1945  [x]  Patient  Verified  Payor: Isaac Hernández / Plan: [place] MEDICARE PART A & B / Product Type: Medicare /    In time:1030  Out time:1124  Total Treatment Time (min): 54  Total Timed Codes (min): 40  1:1 Treatment Time ( only): 40   Visit #:  5    Treatment Area: Low back pain [M54.5]    SUBJECTIVE  Pain Level (0-10 scale): 3  Any medication changes, allergies to medications, adverse drug reactions, diagnosis change, or new procedure performed?: [x] No    [] Yes (see summary sheet for update)  Subjective functional status/changes:   [] No changes reported  Patient reports she is feeling stiff and achy all over her body. OBJECTIVE    54 min Therapeutic Exercise:  [] See flow sheet :   Rationale: increase ROM and increase strength to improve the patients ability to perform ADLs and reduce pain levels          With   [] TE   [] TA   [] Neuro   [] SC   [] other: Patient Education: [x] Review HEP    [] Progressed/Changed HEP based on:   [] positioning   [] body mechanics   [] transfers   [] heat/ice application    [] other:      Other Objective/Functional Measures: none noted     Pain Level (0-10 scale) post treatment: 3/10    ASSESSMENT/Changes in Function:   Patient demonstrated good control and stability while ambulating with a SPC. Will continue to fatigue with step ups. Tolerated all therex, will continue to progress as tolerated. Patient will continue to benefit from skilled PT services to modify and progress therapeutic interventions, address functional mobility deficits, address ROM deficits, address strength deficits, analyze and address soft tissue restrictions, analyze and cue movement patterns, analyze and modify body mechanics/ergonomics and assess and modify postural abnormalities to attain remaining goals.      [x]  See Plan of Care  []  See progress note/recertification  []  See Discharge Summary Progress towards goals / Updated goals:  Patient is progressing towards goals.      PLAN  [x]  Upgrade activities as tolerated     [x]  Continue plan of care  [x]  Update interventions per flow sheet       []  Discharge due to:_  []  Other:_      Jared Lee, PTA 8/27/2021

## 2021-09-01 ENCOUNTER — HOSPITAL ENCOUNTER (OUTPATIENT)
Dept: PHYSICAL THERAPY | Age: 76
Discharge: HOME | End: 2021-09-01
Payer: MEDICARE

## 2021-09-01 PROCEDURE — 97110 THERAPEUTIC EXERCISES: CPT

## 2021-09-01 NOTE — PROGRESS NOTES
PT DAILY TREATMENT NOTE - [facility] 2-15    Patient Name: Eduardo Amin  Date:2021  : 1945  [x]  Patient  Verified  Payor: [place] MEDICARE / Plan: [place] MEDICARE PART A & B / Product Type: Medicare /    In time:100  Out time:141  Total Treatment Time (min): 41  Total Timed Codes (min): 31  1:1 Treatment Time ( only): 31   Visit #:  6    Treatment Area: Low back pain [M54.5]    SUBJECTIVE  Pain Level (0-10 scale): 3  Any medication changes, allergies to medications, adverse drug reactions, diagnosis change, or new procedure performed?: [x] No    [] Yes (see summary sheet for update)  Subjective functional status/changes:   [] No changes reported  Patient reports she is feeling a bit more fatigued today. OBJECTIVE    41 min Therapeutic Exercise:  [x] See flow sheet :   Rationale: increase ROM and increase strength to improve the patients ability to perform ADLs and reduce pain levels          With   [] TE   [] TA   [] Neuro   [] SC   [] other: Patient Education: [x] Review HEP    [] Progressed/Changed HEP based on:   [] positioning   [] body mechanics   [] transfers   [] heat/ice application    [] other:      Other Objective/Functional Measures: none noted     Pain Level (0-10 scale) post treatment: 3/10    ASSESSMENT/Changes in Function:   Patient tolerated all therex well, will continue to progress as tolerated. Patient will continue to benefit from skilled PT services to modify and progress therapeutic interventions, address functional mobility deficits, address ROM deficits, address strength deficits, analyze and address soft tissue restrictions, analyze and cue movement patterns, analyze and modify body mechanics/ergonomics and assess and modify postural abnormalities to attain remaining goals. [x]  See Plan of Care  []  See progress note/recertification  []  See Discharge Summary         Progress towards goals / Updated goals:  Patient is progressing towards goals.      PLAN  [x]  Upgrade activities as tolerated     [x]  Continue plan of care  [x]  Update interventions per flow sheet       []  Discharge due to:_  []  Other:_      Edie Sher, LEONARDO 9/1/2021

## 2021-09-08 ENCOUNTER — APPOINTMENT (OUTPATIENT)
Dept: PHYSICAL THERAPY | Age: 76
End: 2021-09-08
Payer: MEDICARE

## 2021-09-10 ENCOUNTER — APPOINTMENT (OUTPATIENT)
Dept: PHYSICAL THERAPY | Age: 76
End: 2021-09-10
Payer: MEDICARE

## 2021-09-14 ENCOUNTER — HOSPITAL ENCOUNTER (OUTPATIENT)
Dept: PHYSICAL THERAPY | Age: 76
Discharge: HOME | End: 2021-09-14
Payer: MEDICARE

## 2021-09-14 PROCEDURE — 97110 THERAPEUTIC EXERCISES: CPT | Performed by: PHYSICAL THERAPIST

## 2021-09-14 NOTE — PROGRESS NOTES
PT DAILY TREATMENT NOTE - [facility] 2-15    Patient Name: Endy Sheppard  Date:2021  : 1945  [x]  Patient  Verified  Payor: Jesus Alberto Ours / Plan: [place] & [place] / Product Type: Medicare /    In time:131  Out time:***  Total Treatment Time (min): ***  Total Timed Codes (min): ***  1:1 Treatment Time ( [address] only): ***  Visit #:  7    Treatment Area: Low back pain [M54.5]    SUBJECTIVE  Pain Level (0-10 scale): 2  Any medication changes, allergies to medications, adverse drug reactions, diagnosis change, or new procedure performed?: [x] No    [] Yes (see summary sheet for update)  Subjective functional status/changes:   [x] No changes reported  Pt states that she is sleeping in the bed through the night. States the nerve pain has subsided. OBJECTIVE    41 min Therapeutic Exercise:  [x] See flow sheet :   Rationale: increase ROM and increase strength to improve the patients ability to perform ADLs and reduce pain levels          With   [] TE   [] TA   [] Neuro   [] SC   [] other: Patient Education: [x] Review HEP    [] Progressed/Changed HEP based on:   [] positioning   [] body mechanics   [] transfers   [] heat/ice application    [] other:      Other Objective/Functional Measures: none noted     Pain Level (0-10 scale) post treatment: ***/10    ASSESSMENT/Changes in Function:   Pt is progressing slowly. She is improving and having less symptoms but she continues to complain of weakness, shaking, and poor endurance with standing. Patient will continue to benefit from skilled PT services to modify and progress therapeutic interventions, address functional mobility deficits, address ROM deficits, address strength deficits, analyze and address soft tissue restrictions, analyze and cue movement patterns, analyze and modify body mechanics/ergonomics and assess and modify postural abnormalities to attain remaining goals.      [x]  See Plan of Care  []  See progress note/recertification  []  See Discharge Summary         Progress towards goals / Updated goals:  Patient is progressing towards goals.      PLAN  [x]  Upgrade activities as tolerated     [x]  Continue plan of care  [x]  Update interventions per flow sheet       []  Discharge due to:_  []  Other:_      Belkis Cain, PT 9/14/2021

## 2021-09-16 ENCOUNTER — HOSPITAL ENCOUNTER (OUTPATIENT)
Dept: PHYSICAL THERAPY | Age: 76
Discharge: HOME | End: 2021-09-16
Payer: MEDICARE

## 2021-09-16 PROCEDURE — 97110 THERAPEUTIC EXERCISES: CPT | Performed by: PHYSICAL THERAPIST

## 2021-09-21 ENCOUNTER — HOSPITAL ENCOUNTER (OUTPATIENT)
Dept: PHYSICAL THERAPY | Age: 76
Discharge: HOME | End: 2021-09-21
Payer: MEDICARE

## 2021-09-21 PROCEDURE — 97112 NEUROMUSCULAR REEDUCATION: CPT | Performed by: PHYSICAL THERAPIST

## 2021-09-21 PROCEDURE — 97110 THERAPEUTIC EXERCISES: CPT | Performed by: PHYSICAL THERAPIST

## 2021-09-21 NOTE — PROGRESS NOTES
PT DAILY TREATMENT NOTE - [facility] 2-15    Patient Name: Adalid Cerna  Date:2021  : 1945  [x]  Patient  Verified  Payor: Elisa Meadows / Plan: [place] MED[place]ARE PART A & B / Product Type: Medicare /    In time:100  Out time:141  Total Treatment Time (min): 41  Total Timed Codes (min): 31  1:1 Treatment Time ( [address] only): 31   Visit #:  9    Treatment Area: Low back pain [M54.5]    SUBJECTIVE  Pain Level (0-10 scale): 2  Any medication changes, allergies to medications, adverse drug reactions, diagnosis change, or new procedure performed?: [x] No    [] Yes (see summary sheet for update)  Subjective functional status/changes:   [x] No changes reported    OBJECTIVE    41 min Therapeutic Exercise:  [x] See flow sheet :   Rationale: increase ROM and increase strength to improve the patients ability to perform ADLs and reduce pain levels          With   [] TE   [] TA   [] Neuro   [] SC   [] other: Patient Education: [x] Review HEP    [] Progressed/Changed HEP based on:   [] positioning   [] body mechanics   [] transfers   [] heat/ice application    [] other:      Other Objective/Functional Measures: none noted     Pain Level (0-10 scale) post treatment: 3/10    ASSESSMENT/Changes in Function:   Patient tolerated all therex well, will continue to progress as tolerated. Patient will continue to benefit from skilled PT services to modify and progress therapeutic interventions, address functional mobility deficits, address ROM deficits, address strength deficits, analyze and address soft tissue restrictions, analyze and cue movement patterns, analyze and modify body mechanics/ergonomics and assess and modify postural abnormalities to attain remaining goals. [x]  See Plan of Care  []  See progress note/recertification  []  See Discharge Summary         Progress towards goals / Updated goals:  Patient is progressing towards goals.      PLAN  [x]  Upgrade activities as tolerated     [x]  Continue plan of care  [x]  Update interventions per flow sheet       []  Discharge due to:_  []  Other:_      Tasia Mead, PT 9/21/2021

## 2021-09-23 ENCOUNTER — HOSPITAL ENCOUNTER (OUTPATIENT)
Dept: PHYSICAL THERAPY | Age: 76
Discharge: HOME | End: 2021-09-23
Payer: MEDICARE

## 2021-09-23 PROCEDURE — 97112 NEUROMUSCULAR REEDUCATION: CPT | Performed by: PHYSICAL THERAPIST

## 2021-09-23 PROCEDURE — 97110 THERAPEUTIC EXERCISES: CPT | Performed by: PHYSICAL THERAPIST

## 2021-09-28 ENCOUNTER — HOSPITAL ENCOUNTER (OUTPATIENT)
Dept: PHYSICAL THERAPY | Age: 76
Discharge: HOME | End: 2021-09-28
Payer: MEDICARE

## 2021-09-28 PROCEDURE — 97116 GAIT TRAINING THERAPY: CPT | Performed by: PHYSICAL THERAPIST

## 2021-09-28 PROCEDURE — 97110 THERAPEUTIC EXERCISES: CPT | Performed by: PHYSICAL THERAPIST

## 2021-09-30 ENCOUNTER — HOSPITAL ENCOUNTER (OUTPATIENT)
Dept: PHYSICAL THERAPY | Age: 76
Discharge: HOME | End: 2021-09-30
Payer: MEDICARE

## 2021-09-30 PROCEDURE — 97116 GAIT TRAINING THERAPY: CPT | Performed by: PHYSICAL THERAPIST

## 2021-09-30 PROCEDURE — 97110 THERAPEUTIC EXERCISES: CPT | Performed by: PHYSICAL THERAPIST

## 2021-10-04 NOTE — PROGRESS NOTES
[facility]. Physical Therapy  [address] [place]  Phone: [phone] Fax: [phone]     Plan of Care/Statement of Necessity for Physical Therapy Services  2-15    Patient name: Kelby Meke  : 1945  Provider#: [id number]  Referral source: Sharol Nissen, MD      Medical/Treatment Diagnosis: Low back pain [M54.5]     Prior Hospitalization: see medical history     Comorbidities: anxiety/panic disorder, arthritis, low back pain, BMI>30, depression, diabetes, GI disease, headaches, hearing impairment, HBP, Osteoporosis, Prior surgery  Prior Level of Function: 20 minutes seldom if ever  Medications: Verified on Patient Summary List  Start of Care: 8/10/21      Onset Date: chronic   The Plan of Care and following information is based on the information from the initial evaluation. Assessment/ key information: Pt is a 67 yo female who is in today to begin therapy for low back pain. She states that she has had a long history of back pain that resulted in lumbar fusion of L3-L4 in 2019. Following this surgery she completed a course of therapy and had started going back to the gym. However, that stopped with the pandemic. She admits that she has been more sedentary and since then has also gained weight. 3 months ago she had a new acute onset of pain in the right low back and buttock area that traveled to her foot. Recent MRI suggest moderate to severe foraminal stenosis on the right that has progressed since her pre-op imaging. She presents ambulating slowly with rolling walker. Her strength is diminished throughout the lower extremity bilaterally, and a full assessment is made difficult secondary to discomfort with positioning. Her posture is rounded throughout with forward head and she has decreased flexibility in the hip flexors, hamstrings, and gastroc.   Pt is a good candidate for PT and will benefit from skilled services to address these deficits and work toward the goals listed below. Evaluation Complexity History HIGH Complexity :3+ comorbidities / personal factors will impact the outcome/ POC ; Examination HIGH Complexity : 4+ Standardized tests and measures addressing body structure, function, activity limitation and / or participation in recreation  ;Presentation MEDIUM Complexity : Evolving with changing characteristics  ; Clinical Decision Making MEDIUM Complexity : FOTO score of 26-74  Overall Complexity Rating: MEDIUM    Problem List: pain affecting function, decrease ROM, decrease strength, impaired gait/ balance, decrease ADL/ functional abilitiies, decrease activity tolerance, decrease flexibility/ joint mobility and decrease transfer abilities   Treatment Plan may include any combination of the following: Therapeutic exercise, Therapeutic activities, Neuromuscular re-education, Physical agent/modality, Gait/balance training, Manual therapy, Patient education, Self Care training, Functional mobility training, Home safety training and Stair training  Patient / Family readiness to learn indicated by: asking questions, trying to perform skills and interest  Persons(s) to be included in education: patient (P)  Barriers to Learning/Limitations: None  Patient Goal (s): more strength  Patient Self Reported Health Status: fair  Rehabilitation Potential: fair    Short Term Goals: To be accomplished in 10-12 treatments:   Pt will be I with HEP  Pt will complain of pain 2-3/10 with all activity  Pt will increase ROM to complete all ADL's more efficiently  Pt will be able to tolerate 30 minute treatment session without increased symptoms    Long Term Goals:  To be accomplished in 18-20 treatments:   Pt will complain of pain 1-2/10 with all activity  Pt will increase strength to stabilize the spine and maintain improved postures with all activity  Pt will increase FOTO score by 7 points to meet MDC and improve their function  Pt will decrease radicular symptoms by 100% to complete all activity  Pt will ambulate with the     Frequency / Duration: Patient to be seen 2 times per week for 20 treatments. Patient/ Caregiver education and instruction: self care, activity modification and exercises    [x]  Plan of care has been reviewed with PTA        Certification Period: 8/10/21-11/8/21  Blade Vaca, PT 8/10/21     ________________________________________________________________________    I certify that the above Therapy Services are being furnished while the patient is under my care. I agree with the treatment plan and certify that this therapy is necessary.     Physician's Signature:____________________  Date:____________Time: _________         Gurinder Valderrama MD

## 2021-10-05 ENCOUNTER — HOSPITAL ENCOUNTER (OUTPATIENT)
Dept: PHYSICAL THERAPY | Age: 76
Discharge: HOME | End: 2021-10-05
Payer: MEDICARE

## 2021-10-05 PROCEDURE — 97116 GAIT TRAINING THERAPY: CPT | Performed by: PHYSICAL THERAPIST

## 2021-10-05 PROCEDURE — 97110 THERAPEUTIC EXERCISES: CPT | Performed by: PHYSICAL THERAPIST

## 2021-10-07 ENCOUNTER — APPOINTMENT (OUTPATIENT)
Dept: PHYSICAL THERAPY | Age: 76
End: 2021-10-07
Payer: MEDICARE

## 2021-10-12 ENCOUNTER — HOSPITAL ENCOUNTER (OUTPATIENT)
Dept: PHYSICAL THERAPY | Age: 76
Discharge: HOME | End: 2021-10-12
Payer: MEDICARE

## 2021-10-12 PROCEDURE — 97110 THERAPEUTIC EXERCISES: CPT | Performed by: PHYSICAL THERAPIST

## 2021-10-12 PROCEDURE — 97116 GAIT TRAINING THERAPY: CPT | Performed by: PHYSICAL THERAPIST

## 2021-10-12 NOTE — PROGRESS NOTES
PT DAILY TREATMENT NOTE - [facility] 2-15    Patient Name: Dalila Rayo  Date:10/12/2021  : 1945  [x]  Patient  Verified  Payor: Reynaldo Stanley / Plan: [place] MEDICARE PART A & B / Product Type: Medicare /    In time:130  Out time:***  Total Treatment Time (min): ***  Total Timed Codes (min): ***  1:1 Treatment Time ( only): ***   Visit #:  14    Treatment Area: Low back pain [M54.50]    SUBJECTIVE  Pain Level (0-10 scale): 3  Any medication changes, allergies to medications, adverse drug reactions, diagnosis change, or new procedure performed?: [x] No    [] Yes (see summary sheet for update)  Subjective functional status/changes:   [] No changes reported  Pt reports that the pain is a little worse today.   She states she woke up that way    OBJECTIVE    Modality rationale: {BSHSI INMOTION MODALITIES:04112} to improve the patients ability to ***   Min Type Additional Details       [] Estim: []Att   []Unatt    []TENS instruct                  []IFC  []Premod   []NMES                     []Other:  []w/US   []w/ice   []w/heat  Position:  Location:       []  Traction: [] Cervical       []Lumbar                       [] Prone          []Supine                       []Intermittent   []Continuous Lbs:  [] before manual  [] after manual  []w/heat    []  Ultrasound: []Continuous   [] Pulsed                       at: []1MHz   []3MHz Location:  W/cm2:    [] Paraffin         Location:   []w/heat    []  Ice     []  Heat  []  Ice massage Position:  Location:    []  Laser  []  Other: Position:  Location:      []  Vasopneumatic Device Pressure:       [] lo [] med [] hi   Temperature:      [x] Skin assessment post-treatment:  [x]intact []redness- no adverse reaction    []redness  adverse reaction:     *** min Therapeutic Exercise:  [] See flow sheet :   Rationale: {BSHSI IMMOTION THER EX:56661} to improve the patients ability to ***    *** min Therapeutic Activity:  []  See flow sheet :   Rationale: {BSHSI IMMOTION THER TM:13403} to improve the patients ability to ***     *** min Neuromuscular Re-education:  []  See flow sheet :   Rationale: {BSHSI IMMOTION THER EX:59544}  to improve the patients ability to ***    *** min Self Care/Home Management:  []  See flow sheet :   Rationale: {BSHSI IMMOTION THER EX:09380}  to improve the patients ability to ***    *** min Manual Therapy: ***    Rationale: {BSI IMMOTION MANUAL THERAPY:26610} to improve the patients ability to ***    *** min Gait Training:  ___ feet with ___ device on level surfaces with ___ level of assist   Rationale: {BSHSI IMMOTION THER EX:41684}  to improve the patients ability to ***          With   [] TE   [] TA   [] Neuro   [] SC   [] other: Patient Education: [x] Review HEP    [] Progressed/Changed HEP based on:   [] positioning   [] body mechanics   [] transfers   [] heat/ice application    [] other:      Other Objective/Functional Measures: ***     Pain Level (0-10 scale) post treatment: ***    ASSESSMENT/Changes in Function:   ***  Patient will continue to benefit from skilled PT services to {[facility] INMOTION ASSESSMENT STATEMENTS:20159} to attain remaining goals.      []  See Plan of Care  []  See progress note/recertification  []  See Discharge Summary         Progress towards goals / Updated goals:  ***    PLAN  []  Upgrade activities as tolerated     []  Continue plan of care  []  Update interventions per flow sheet       []  Discharge due to:_  []  Other:_      Chip Buchanan, PT 10/12/2021

## 2021-10-14 ENCOUNTER — HOSPITAL ENCOUNTER (OUTPATIENT)
Dept: PHYSICAL THERAPY | Age: 76
Discharge: HOME | End: 2021-10-14
Payer: MEDICARE

## 2021-10-14 PROCEDURE — 97110 THERAPEUTIC EXERCISES: CPT | Performed by: PHYSICAL THERAPIST

## 2021-10-19 ENCOUNTER — HOSPITAL ENCOUNTER (OUTPATIENT)
Dept: PHYSICAL THERAPY | Age: 76
Discharge: HOME | End: 2021-10-19
Payer: MEDICARE

## 2021-10-19 PROCEDURE — 97110 THERAPEUTIC EXERCISES: CPT | Performed by: PHYSICAL THERAPIST

## 2021-10-21 ENCOUNTER — APPOINTMENT (OUTPATIENT)
Dept: PHYSICAL THERAPY | Age: 76
End: 2021-10-21
Payer: MEDICARE

## 2021-10-26 ENCOUNTER — HOSPITAL ENCOUNTER (OUTPATIENT)
Dept: PHYSICAL THERAPY | Age: 76
Discharge: HOME | End: 2021-10-26
Payer: MEDICARE

## 2021-10-26 PROCEDURE — 97116 GAIT TRAINING THERAPY: CPT | Performed by: PHYSICAL THERAPIST

## 2021-10-26 PROCEDURE — 97110 THERAPEUTIC EXERCISES: CPT | Performed by: PHYSICAL THERAPIST

## 2021-10-28 ENCOUNTER — APPOINTMENT (OUTPATIENT)
Dept: PHYSICAL THERAPY | Age: 76
End: 2021-10-28
Payer: MEDICARE

## 2021-11-08 ENCOUNTER — OFFICE VISIT (OUTPATIENT)
Dept: NEUROLOGY | Age: 76
End: 2021-11-08
Payer: MEDICARE

## 2021-11-08 VITALS
OXYGEN SATURATION: 96 % | BODY MASS INDEX: 37 KG/M2 | DIASTOLIC BLOOD PRESSURE: 70 MMHG | SYSTOLIC BLOOD PRESSURE: 120 MMHG | HEIGHT: 61 IN | HEART RATE: 92 BPM | WEIGHT: 196 LBS

## 2021-11-08 DIAGNOSIS — R53.81 PHYSICAL DEBILITY: ICD-10-CM

## 2021-11-08 DIAGNOSIS — M51.36 DDD (DEGENERATIVE DISC DISEASE), LUMBAR: ICD-10-CM

## 2021-11-08 DIAGNOSIS — G25.0 TREMOR, ESSENTIAL: Primary | ICD-10-CM

## 2021-11-08 PROCEDURE — G8427 DOCREV CUR MEDS BY ELIG CLIN: HCPCS | Performed by: PSYCHIATRY & NEUROLOGY

## 2021-11-08 PROCEDURE — 1101F PT FALLS ASSESS-DOCD LE1/YR: CPT | Performed by: PSYCHIATRY & NEUROLOGY

## 2021-11-08 PROCEDURE — G8510 SCR DEP NEG, NO PLAN REQD: HCPCS | Performed by: PSYCHIATRY & NEUROLOGY

## 2021-11-08 PROCEDURE — 99214 OFFICE O/P EST MOD 30 MIN: CPT | Performed by: PSYCHIATRY & NEUROLOGY

## 2021-11-08 PROCEDURE — G8399 PT W/DXA RESULTS DOCUMENT: HCPCS | Performed by: PSYCHIATRY & NEUROLOGY

## 2021-11-08 PROCEDURE — G8536 NO DOC ELDER MAL SCRN: HCPCS | Performed by: PSYCHIATRY & NEUROLOGY

## 2021-11-08 PROCEDURE — 1090F PRES/ABSN URINE INCON ASSESS: CPT | Performed by: PSYCHIATRY & NEUROLOGY

## 2021-11-08 PROCEDURE — G8417 CALC BMI ABV UP PARAM F/U: HCPCS | Performed by: PSYCHIATRY & NEUROLOGY

## 2021-11-08 RX ORDER — PREGABALIN 50 MG/1
CAPSULE ORAL
COMMUNITY
Start: 2021-09-13

## 2021-11-08 RX ORDER — FUROSEMIDE 20 MG/1
TABLET ORAL
COMMUNITY
Start: 2021-07-02 | End: 2021-11-08

## 2021-11-08 NOTE — PROGRESS NOTES
Chief Complaint   Patient presents with    Follow-up     tremors \" I think they are getting worse. \"       HPI    23-year-old woman following up. I saw her initially for tremor and degenerative disc disease. Since I saw her last she did see orthopedic spine and had neuroimaging completed showing multilevel disease with recommendation for steroid injections and medication and PT. she completed PT with improvement. She does feel stronger. Her spouse is present and also agrees. She does use a walker when she is outside of the house. She has chronic daily pain at baseline its achy in nature. Tremor has good days and bad days affecting the arms and head and voice. She is also struggling with more anxiety and depression. She has a new primary care physician. Background:  Ms. Eddie Parker is a 23-year-old woman with a history of diabetes, severe lumbosacral spine disease, high cholesterol, high blood pressure here for shaking and tremor. I spoke with her personally as well as her spouse in the room. I reviewed the medical record. I reviewed neuroimaging completed of the spine in the past.  She is here because in the past 2 years she has had a tremor that in the past 8 months has worsened affecting her arms legs whole body sometimes her head. Tremor can fluctuate in severity and sometimes can be very mild. In the last 2 weeks the tremor is worse in the setting of an exacerbation of her low back pain. She has a history of severe spinal stenosis prompting surgery. Since her surgery in 2019 she has had difficulty walking independently and uses a walker. During Covid, she also became extremely isolated with increasing debility due to lack of physical activity. She denies any hallucinations or constipation or drooling. No falls. Handwriting is affected from her tremor. Sometimes she acts out in her sleep. She has a history of Ménière's. She has depression long-term getting worse due to debility.   She also is very fearful of falling feeling like she is imbalanced. Review of Systems   Musculoskeletal: Positive for back pain and myalgias. Neurological: Positive for tremors. Psychiatric/Behavioral: Positive for depression. Negative for suicidal ideas. The patient is nervous/anxious. Past Medical History:   Diagnosis Date    Chronic pain     back/steroid injections    Diabetes ([address]     GERD (gastroesophageal reflux disease)     hiatal hernia    Hypertension     Menopause     Psychiatric disorder     anxiety/depression     Family History   Problem Relation Age of Onset    Heart Disease Mother         CHF    Alcohol abuse Father     Hypertension Father     Diabetes Father     Cancer Sister         breast    Breast Cancer Sister      Social History     Socioeconomic History    Marital status:      Spouse name: Not on file    Number of children: Not on file    Years of education: Not on file    Highest education level: Not on file   Occupational History    Not on file   Tobacco Use    Smoking status: Never Smoker    Smokeless tobacco: Never Used   Vaping Use    Vaping Use: Never used   Substance and Sexual Activity    Alcohol use: Never    Drug use: Never    Sexual activity: Not on file   Other Topics Concern    Not on file   Social History Narrative    Not on file     Social Determinants of Health     Financial Resource Strain:     Difficulty of Paying Living Expenses: Not on file   Food Insecurity:     Worried About Running Out of Food in the Last Year: Not on file    Marquez of Food in the Last Year: Not on file   Transportation Needs:     Lack of Transportation (Medical): Not on file    Lack of Transportation (Non-Medical):  Not on file   Physical Activity:     Days of Exercise per Week: Not on file    Minutes of Exercise per Session: Not on file   Stress:     Feeling of Stress : Not on file   Social Connections:     Frequency of Communication with Friends and Family: Not on file    Frequency of Social Gatherings with Friends and Family: Not on file    Attends Jain Services: Not on file    Active Member of Clubs or Organizations: Not on file    Attends Club or Organization Meetings: Not on file    Marital Status: Not on file   Intimate Partner Violence:     Fear of Current or Ex-Partner: Not on file    Emotionally Abused: Not on file    Physically Abused: Not on file    Sexually Abused: Not on file   Housing Stability:     Unable to Pay for Housing in the Last Year: Not on file    Number of [place] in the Last Year: Not on file    Unstable Housing in the Last Year: Not on file     No Known Allergies      Current Outpatient Medications   Medication Sig    pregabalin (LYRICA) 50 mg capsule     metFORMIN (GLUCOPHAGE) 500 mg tablet Take  by mouth two (2) times daily (with meals).  citalopram (CELEXA) 40 mg tablet Take 40 mg by mouth every evening.  lisinopril-hydroCHLOROthiazide (PRINZIDE, ZESTORETIC) 10-12.5 mg per tablet Take 1 Tab by mouth daily.  atorvastatin (LIPITOR) 20 mg tablet Take 20 mg by mouth daily.  omeprazole (PRILOSEC OTC) 20 mg tablet Take 20 mg by mouth as needed. No current facility-administered medications for this visit. Neurologic Exam     Mental Status   WD/WN adult in NAD, normal grooming  VSS  A&O x 3    PERRL, nonicteric  Face is masked  Speech is clear  No limb ataxia. Intermittent tremor of the head and voice sometimes in the left arm or right arm at times spontaneously abating and sometimes seems resting. Moving all extemities spontaneously and symmetric  Wide gait with walker             Visit Vitals  /70 (BP 1 Location: Left upper arm, BP Patient Position: Sitting)   Pulse 92   Ht 5' 1\" (1.549 m)   Wt 196 lb (88.9 kg)   SpO2 96%   BMI 37.03 kg/m²       Assessment and Plan   Diagnoses and all orders for this visit:    1. Tremor, essential    2. DDD (degenerative disc disease), lumbar    3. Physical debility      56-year-old woman who has various issues neurologically. She does have what I suspect is more essential tremor in the extremities and head which will be very sensitive to pain level and fatigue and medications. Certainly a secondary parkinsonian process is possible however I do not think taking any carbidopa would be helpful at this point. Her tremor is more suggestive of an essential type however that does not mean she could not have Parkinson's at some point in her life. I think she needs to be followed clinically. I like to reexamine her in about 6 months. We talked about the degenerative changes in her spine. I think it is reasonable for her to follow-up with orthopedics to think about next steps like a steroid injection. Possibly more PT. She needs to use the walker for safety purposes. She is a high falls risk. She is thinking about possibly adjusting her psychotropics. Will defer to her new physician. I had like to see her in about 6 months. 30 minutes of time was spent in total today reviewing the medical record to include the external orthopedic notes, neuroimaging of the lumbosacral spine, face-to-face time discussing symptoms and plans, and time completing documentation. I reviewed and decided to continue the current medications. This clinical note was dictated with an electronic dictation software that can make unintentional errors. If there are any questions, please contact me directly for clarification.       2 [facility], [facility] [address]  Diplomate CASEY

## 2021-11-08 NOTE — PROGRESS NOTES
Chief Complaint   Patient presents with    Follow-up     tremors \" I think they are getting worse. \"

## 2021-11-08 NOTE — LETTER
11/8/2021     Patient:  Barrie Lazo   YOB: 1945  Date of Visit: 11/8/2021      Dear Adalid Honeycutt MD  [address] [place] [id number]  Via Fax: [phone]:      I was requested by Other, MD Zeb to evaluate Ms. Barrie Lazo  for   Chief Complaint   Patient presents with    Follow-up     tremors \" I think they are getting worse. \"   . I am recommending the following:     Diagnoses and all orders for this visit:    1. Tremor, essential    2. DDD (degenerative disc disease), lumbar    3. Physical debility        ----------------------------------------------------------------------------------------------------------------------  Below is my encounter:       Chief Complaint   Patient presents with    Follow-up     tremors \" I think they are getting worse. \"       HPI    61-year-old woman following up. I saw her initially for tremor and degenerative disc disease. Since I saw her last she did see orthopedic spine and had neuroimaging completed showing multilevel disease with recommendation for steroid injections and medication and PT. she completed PT with improvement. She does feel stronger. Her spouse is present and also agrees. She does use a walker when she is outside of the house. She has chronic daily pain at baseline its achy in nature. Tremor has good days and bad days affecting the arms and head and voice. She is also struggling with more anxiety and depression. She has a new primary care physician. Background:  Ms. LOPEZ [facility] is a 61-year-old woman with a history of diabetes, severe lumbosacral spine disease, high cholesterol, high blood pressure here for shaking and tremor. I spoke with her personally as well as her spouse in the room. I reviewed the medical record.   I reviewed neuroimaging completed of the spine in the past.  She is here because in the past 2 years she has had a tremor that in the past 8 months has worsened affecting her arms legs whole body sometimes her head. Tremor can fluctuate in severity and sometimes can be very mild. In the last 2 weeks the tremor is worse in the setting of an exacerbation of her low back pain. She has a history of severe spinal stenosis prompting surgery. Since her surgery in 2019 she has had difficulty walking independently and uses a walker. During Covid, she also became extremely isolated with increasing debility due to lack of physical activity. She denies any hallucinations or constipation or drooling. No falls. Handwriting is affected from her tremor. Sometimes she acts out in her sleep. She has a history of Ménière's. She has depression long-term getting worse due to debility. She also is very fearful of falling feeling like she is imbalanced. Review of Systems   Musculoskeletal: Positive for back pain and myalgias. Neurological: Positive for tremors. Psychiatric/Behavioral: Positive for depression. Negative for suicidal ideas. The patient is nervous/anxious.         Past Medical History:   Diagnosis Date    Chronic pain     back/steroid injections    Diabetes ([address]     GERD (gastroesophageal reflux disease)     hiatal hernia    Hypertension     Menopause     Psychiatric disorder     anxiety/depression     Family History   Problem Relation Age of Onset    Heart Disease Mother         CHF    Alcohol abuse Father     Hypertension Father     Diabetes Father     Cancer Sister         breast    Breast Cancer Sister      Social History     Socioeconomic History    Marital status:      Spouse name: Not on file    Number of children: Not on file    Years of education: Not on file    Highest education level: Not on file   Occupational History    Not on file   Tobacco Use    Smoking status: Never Smoker    Smokeless tobacco: Never Used   Vaping Use    Vaping Use: Never used   Substance and Sexual Activity    Alcohol use: Never    Drug use: Never    Sexual activity: Not on file   Other Topics Concern    Not on file   Social History Narrative    Not on file     Social Determinants of Health     Financial Resource Strain:     Difficulty of Paying Living Expenses: Not on file   Food Insecurity:     Worried About [address] in the Last Year: Not on file    Marquez of Food in the Last Year: Not on file   Transportation Needs:     Lack of Transportation (Medical): Not on file    Lack of Transportation (Non-Medical): Not on file   Physical Activity:     Days of Exercise per Week: Not on file    Minutes of Exercise per Session: Not on file   Stress:     Feeling of Stress : Not on file   Social Connections:     Frequency of Communication with Friends and Family: Not on file    Frequency of Social Gatherings with Friends and Family: Not on file    Attends [employer] Services: Not on file    Active Member of [address] [place] [address] or Organizations: Not on file    Attends Club or Organization Meetings: Not on file    Marital Status: Not on file   Intimate Partner Violence:     Fear of Current or Ex-Partner: Not on file    Emotionally Abused: Not on file    Physically Abused: Not on file    Sexually Abused: Not on file   Housing Stability:     Unable to Pay for Housing in the Last Year: Not on file    Number of [place] in the Last Year: Not on file    Unstable Housing in the Last Year: Not on file     No Known Allergies      Current Outpatient Medications   Medication Sig    pregabalin (LYRICA) 50 mg capsule     metFORMIN (GLUCOPHAGE) 500 mg tablet Take  by mouth two (2) times daily (with meals).  citalopram (CELEXA) 40 mg tablet Take 40 mg by mouth every evening.  lisinopril-hydroCHLOROthiazide (PRINZIDE, ZESTORETIC) 10-12.5 mg per tablet Take 1 Tab by mouth daily.  atorvastatin (LIPITOR) 20 mg tablet Take 20 mg by mouth daily.  omeprazole (PRILOSEC OTC) 20 mg tablet Take 20 mg by mouth as needed. No current facility-administered medications for this visit. Neurologic Exam     Mental Status   WD/WN adult in NAD, normal grooming  VSS  A&O x 3    PERRL, nonicteric  Face is masked  Speech is clear  No limb ataxia. Intermittent tremor of the head and voice sometimes in the left arm or right arm at times spontaneously abating and sometimes seems resting. Moving all extemities spontaneously and symmetric  Wide gait with walker             Visit Vitals  /70 (BP 1 Location: Left upper arm, BP Patient Position: Sitting)   Pulse 92   Ht 5' 1\" (1.549 m)   Wt 196 lb (88.9 kg)   SpO2 96%   BMI 37.03 kg/m²       Assessment and Plan   Diagnoses and all orders for this visit:    1. Tremor, essential    2. DDD (degenerative disc disease), lumbar    3. Physical debility      40-year-old woman who has various issues neurologically. She does have what I suspect is more essential tremor in the extremities and head which will be very sensitive to pain level and fatigue and medications. Certainly a secondary parkinsonian process is possible however I do not think taking any carbidopa would be helpful at this point. Her tremor is more suggestive of an essential type however that does not mean she could not have Parkinson's at some point in her life. I think she needs to be followed clinically. I like to reexamine her in about 6 months. We talked about the degenerative changes in her spine. I think it is reasonable for her to follow-up with orthopedics to think about next steps like a steroid injection. Possibly more PT. She needs to use the walker for safety purposes. She is a high falls risk. She is thinking about possibly adjusting her psychotropics. Will defer to her new physician. I had like to see her in about 6 months.   30 minutes of time was spent in total today reviewing the medical record to include the external orthopedic notes, neuroimaging of the lumbosacral spine, face-to-face time discussing symptoms and plans, and time completing documentation. I reviewed and decided to continue the current medications. This clinical note was dictated with an electronic dictation software that can make unintentional errors. If there are any questions, please contact me directly for clarification. Thank you for giving me the opportunity to assist in the care of Ms. Tom Thao.  If you have questions, please do not hesitate to contact me.         Sincerely,      [facility], DO  Neurologist  Brain Injury Medicine  Diplomate ABPN

## 2022-03-19 PROBLEM — Z98.1 S/P LUMBAR SPINAL FUSION: Status: ACTIVE | Noted: 2019-10-01

## 2022-03-20 PROBLEM — M48.062 SPINAL STENOSIS OF LUMBAR REGION WITH NEUROGENIC CLAUDICATION: Status: ACTIVE | Noted: 2019-09-30

## 2022-05-06 ENCOUNTER — TRANSCRIBE ORDER (OUTPATIENT)
Dept: SCHEDULING | Age: 77
End: 2022-05-06

## 2022-05-06 DIAGNOSIS — Z12.31 VISIT FOR SCREENING MAMMOGRAM: Primary | ICD-10-CM

## 2022-05-09 ENCOUNTER — TRANSCRIBE ORDER (OUTPATIENT)
Dept: SCHEDULING | Age: 77
End: 2022-05-09

## 2022-05-09 DIAGNOSIS — N95.8 OTHER SPECIFIED MENOPAUSAL AND PERIMENOPAUSAL DISORDERS: Primary | ICD-10-CM

## 2022-06-10 ENCOUNTER — HOSPITAL ENCOUNTER (OUTPATIENT)
Dept: MAMMOGRAPHY | Age: 77
Discharge: HOME | End: 2022-06-10
Attending: INTERNAL MEDICINE
Payer: MEDICARE

## 2022-06-10 DIAGNOSIS — Z12.31 VISIT FOR SCREENING MAMMOGRAM: ICD-10-CM

## 2022-06-10 PROCEDURE — 77063 BREAST TOMOSYNTHESIS BI: CPT

## 2022-06-23 ENCOUNTER — HOSPITAL ENCOUNTER (OUTPATIENT)
Dept: MAMMOGRAPHY | Age: 77
Discharge: HOME | End: 2022-06-23
Attending: INTERNAL MEDICINE
Payer: MEDICARE

## 2022-06-23 DIAGNOSIS — N95.8 OTHER SPECIFIED MENOPAUSAL AND PERIMENOPAUSAL DISORDERS: ICD-10-CM

## 2022-06-23 PROCEDURE — 77080 DXA BONE DENSITY AXIAL: CPT

## 2022-07-12 ENCOUNTER — OFFICE VISIT (OUTPATIENT)
Dept: NEUROLOGY | Age: 77
End: 2022-07-12
Payer: MEDICARE

## 2022-07-12 VITALS
HEART RATE: 104 BPM | HEIGHT: 61 IN | OXYGEN SATURATION: 96 % | DIASTOLIC BLOOD PRESSURE: 84 MMHG | BODY MASS INDEX: 37.38 KG/M2 | SYSTOLIC BLOOD PRESSURE: 138 MMHG | WEIGHT: 198 LBS

## 2022-07-12 DIAGNOSIS — R53.81 PHYSICAL DEBILITY: ICD-10-CM

## 2022-07-12 DIAGNOSIS — M51.36 DDD (DEGENERATIVE DISC DISEASE), LUMBAR: ICD-10-CM

## 2022-07-12 DIAGNOSIS — G25.0 TREMOR, ESSENTIAL: Primary | ICD-10-CM

## 2022-07-12 PROCEDURE — G8417 CALC BMI ABV UP PARAM F/U: HCPCS | Performed by: PSYCHIATRY & NEUROLOGY

## 2022-07-12 PROCEDURE — 99214 OFFICE O/P EST MOD 30 MIN: CPT | Performed by: PSYCHIATRY & NEUROLOGY

## 2022-07-12 PROCEDURE — 1123F ACP DISCUSS/DSCN MKR DOCD: CPT | Performed by: PSYCHIATRY & NEUROLOGY

## 2022-07-12 PROCEDURE — 1101F PT FALLS ASSESS-DOCD LE1/YR: CPT | Performed by: PSYCHIATRY & NEUROLOGY

## 2022-07-12 PROCEDURE — G8399 PT W/DXA RESULTS DOCUMENT: HCPCS | Performed by: PSYCHIATRY & NEUROLOGY

## 2022-07-12 PROCEDURE — G8510 SCR DEP NEG, NO PLAN REQD: HCPCS | Performed by: PSYCHIATRY & NEUROLOGY

## 2022-07-12 PROCEDURE — 1090F PRES/ABSN URINE INCON ASSESS: CPT | Performed by: PSYCHIATRY & NEUROLOGY

## 2022-07-12 PROCEDURE — G8536 NO DOC ELDER MAL SCRN: HCPCS | Performed by: PSYCHIATRY & NEUROLOGY

## 2022-07-12 PROCEDURE — G8427 DOCREV CUR MEDS BY ELIG CLIN: HCPCS | Performed by: PSYCHIATRY & NEUROLOGY

## 2022-07-12 NOTE — PROGRESS NOTES
Chief Complaint   Patient presents with    Follow-up     Tremors     Visit Vitals  /84 (BP 1 Location: Left upper arm, BP Patient Position: Sitting)   Pulse (!) 104   Ht 5' 1\" (1.549 m)   Wt 198 lb (89.8 kg)   SpO2 96%   BMI 37.41 kg/m²

## 2022-07-12 NOTE — PROGRESS NOTES
Chief Complaint   Patient presents with    Follow-up     Tremors       HPI    58-year-old woman following up. I saw her initially for tremor and degenerative disc disease. Since I saw her last she did not proceed with injections from Ortho spine. She is feeling weaker with more tremor. Uses a walker but needs to be encouraged to do so. She is quite sedentary. Having some depression. She has chronic daily pain at baseline its achy in nature. Tremor has good days and bad days affecting the arms and head and voice. No hallucinations. No drooling. Background:  Ms. Shaggy Pineda is a 58-year-old woman with a history of diabetes, severe lumbosacral spine disease, high cholesterol, high blood pressure here for shaking and tremor. I spoke with her personally as well as her spouse in the room. I reviewed the medical record. I reviewed neuroimaging completed of the spine in the past.  She is here because in the past 2 years she has had a tremor that in the past 8 months has worsened affecting her arms legs whole body sometimes her head. Tremor can fluctuate in severity and sometimes can be very mild. In the last 2 weeks the tremor is worse in the setting of an exacerbation of her low back pain. She has a history of severe spinal stenosis prompting surgery. Since her surgery in 2019 she has had difficulty walking independently and uses a walker. During Covid, she also became extremely isolated with increasing debility due to lack of physical activity. She denies any hallucinations or constipation or drooling. No falls. Handwriting is affected from her tremor. Sometimes she acts out in her sleep. She has a history of Ménière's. She has depression long-term getting worse due to debility. She also is very fearful of falling feeling like she is imbalanced. Review of Systems   Musculoskeletal: Positive for back pain and myalgias. Neurological: Positive for tremors.    Psychiatric/Behavioral: Positive for depression. Negative for suicidal ideas. The patient is nervous/anxious. Past Medical History:   Diagnosis Date    Chronic pain     back/steroid injections    Diabetes ([address]     GERD (gastroesophageal reflux disease)     hiatal hernia    Hypertension     Menopause     Psychiatric disorder     anxiety/depression     Family History   Problem Relation Age of Onset    Heart Disease Mother         CHF    Alcohol abuse Father     Hypertension Father     Diabetes Father     Cancer Sister         breast    Breast Cancer Sister 79     Social History     Socioeconomic History    Marital status:      Spouse name: Not on file    Number of children: Not on file    Years of education: Not on file    Highest education level: Not on file   Occupational History    Not on file   Tobacco Use    Smoking status: Never Smoker    Smokeless tobacco: Never Used   Vaping Use    Vaping Use: Never used   Substance and Sexual Activity    Alcohol use: Never    Drug use: Never    Sexual activity: Not on file   Other Topics Concern    Not on file   Social History Narrative    Not on file     Social Determinants of Health     Financial Resource Strain:     Difficulty of Paying Living Expenses: Not on file   Food Insecurity:     Worried About Running Out of Food in the Last Year: Not on file    Marquez of Food in the Last Year: Not on file   Transportation Needs:     Lack of Transportation (Medical): Not on file    Lack of Transportation (Non-Medical):  Not on file   Physical Activity:     Days of Exercise per Week: Not on file    Minutes of Exercise per Session: Not on file   Stress:     Feeling of Stress : Not on file   Social Connections:     Frequency of Communication with Friends and Family: Not on file    Frequency of Social Gatherings with Friends and Family: Not on file    Attends Sikhism Services: Not on file    Active Member of Clubs or Organizations: Not on file    Attends Club or Organization Meetings: Not on file    Marital Status: Not on file   Intimate Partner Violence:     Fear of Current or Ex-Partner: Not on file    Emotionally Abused: Not on file    Physically Abused: Not on file    Sexually Abused: Not on file   Housing Stability:     Unable to Pay for Housing in the Last Year: Not on file    Number of [place] in the Last Year: Not on file    Unstable Housing in the Last Year: Not on file     No Known Allergies      Current Outpatient Medications   Medication Sig    pregabalin (LYRICA) 50 mg capsule     metFORMIN (GLUCOPHAGE) 500 mg tablet Take  by mouth two (2) times daily (with meals).  citalopram (CELEXA) 40 mg tablet Take 40 mg by mouth every evening.  lisinopril-hydroCHLOROthiazide (PRINZIDE, ZESTORETIC) 10-12.5 mg per tablet Take 1 Tab by mouth daily.  atorvastatin (LIPITOR) 20 mg tablet Take 20 mg by mouth daily.  omeprazole (PRILOSEC OTC) 20 mg tablet Take 20 mg by mouth as needed. No current facility-administered medications for this visit. Neurologic Exam     Mental Status   WD/WN adult in NAD, normal grooming  VSS  A&O x 3    PERRL, nonicteric  Face is covered  Speech is clear  No limb ataxia. Intermittent tremor of the head and voice sometimes in the left arm or right arm at times spontaneously abating and sometimes seems resting. Moving all extemities spontaneously and symmetric  Wide gait with walker, nonshuffling             Visit Vitals  /84 (BP 1 Location: Left upper arm, BP Patient Position: Sitting)   Pulse (!) 104   Ht 5' 1\" (1.549 m)   Wt 198 lb (89.8 kg)   SpO2 96%   BMI 37.41 kg/m²       Assessment and Plan   Diagnoses and all orders for this visit:    1. Tremor, essential  -     REFERRAL TO HOME HEALTH    2. DDD (degenerative disc disease), lumbar  -     REFERRAL TO HOME HEALTH    3. Physical debility  -     REFERRAL TO HOME HEALTH       40-year-old woman with tremor and pain chronically.   I think her tremor is most likely essential but with a lot of contribution from overall physical debility and deconditioning. I think we need to pursue a course of home PT OT to start. I am not convinced this is Parkinson's at this time would like to see how she does after therapy and we can reassess. We talked about possible medication therapies but again I would like to see her go through therapy first and she and her partner agree. Need to focus on her depression and try to get that better. She needs to participate in therapy to get the most benefit. More physical activity. Next visit we can consider a trial of medications for tremor as needed. I will see her in about 3 months. 30 minutes of time was spent in total today reviewing the medical record today, neuroimaging of the lumbosacral spine, face-to-face time discussing symptoms and plans, and time completing documentation. I reviewed and decided to continue the current medications. This clinical note was dictated with an electronic dictation software that can make unintentional errors. If there are any questions, please contact me directly for clarification.       [address] [facility], [facility] [address]  Diplomate CASEY

## 2022-08-25 ENCOUNTER — TELEPHONE (OUTPATIENT)
Dept: NEUROLOGY | Age: 77
End: 2022-08-25

## 2022-08-25 NOTE — TELEPHONE ENCOUNTER
They faxed over a plan of care for physical therapy and no one has returned the call. Please advise.

## 2022-10-21 ENCOUNTER — TELEPHONE (OUTPATIENT)
Dept: NEUROLOGY | Age: 77
End: 2022-10-21

## 2022-11-09 ENCOUNTER — OFFICE VISIT (OUTPATIENT)
Dept: NEUROLOGY | Age: 77
End: 2022-11-09
Payer: MEDICARE

## 2022-11-09 VITALS
RESPIRATION RATE: 16 BRPM | WEIGHT: 196 LBS | OXYGEN SATURATION: 96 % | DIASTOLIC BLOOD PRESSURE: 70 MMHG | HEIGHT: 61 IN | BODY MASS INDEX: 37 KG/M2 | HEART RATE: 95 BPM | SYSTOLIC BLOOD PRESSURE: 130 MMHG

## 2022-11-09 DIAGNOSIS — M51.36 DDD (DEGENERATIVE DISC DISEASE), LUMBAR: ICD-10-CM

## 2022-11-09 DIAGNOSIS — R53.81 PHYSICAL DEBILITY: ICD-10-CM

## 2022-11-09 DIAGNOSIS — G25.0 TREMOR, ESSENTIAL: Primary | ICD-10-CM

## 2022-11-09 PROCEDURE — 1101F PT FALLS ASSESS-DOCD LE1/YR: CPT | Performed by: PSYCHIATRY & NEUROLOGY

## 2022-11-09 PROCEDURE — 99214 OFFICE O/P EST MOD 30 MIN: CPT | Performed by: PSYCHIATRY & NEUROLOGY

## 2022-11-09 PROCEDURE — G8427 DOCREV CUR MEDS BY ELIG CLIN: HCPCS | Performed by: PSYCHIATRY & NEUROLOGY

## 2022-11-09 PROCEDURE — G8536 NO DOC ELDER MAL SCRN: HCPCS | Performed by: PSYCHIATRY & NEUROLOGY

## 2022-11-09 PROCEDURE — G8417 CALC BMI ABV UP PARAM F/U: HCPCS | Performed by: PSYCHIATRY & NEUROLOGY

## 2022-11-09 PROCEDURE — 1123F ACP DISCUSS/DSCN MKR DOCD: CPT | Performed by: PSYCHIATRY & NEUROLOGY

## 2022-11-09 PROCEDURE — 1090F PRES/ABSN URINE INCON ASSESS: CPT | Performed by: PSYCHIATRY & NEUROLOGY

## 2022-11-09 PROCEDURE — G8510 SCR DEP NEG, NO PLAN REQD: HCPCS | Performed by: PSYCHIATRY & NEUROLOGY

## 2022-11-09 PROCEDURE — G8399 PT W/DXA RESULTS DOCUMENT: HCPCS | Performed by: PSYCHIATRY & NEUROLOGY

## 2022-11-09 RX ORDER — PROPRANOLOL HYDROCHLORIDE 60 MG/1
60 CAPSULE, EXTENDED RELEASE ORAL DAILY
Qty: 90 CAPSULE | Refills: 1 | Status: SHIPPED | OUTPATIENT
Start: 2022-11-09

## 2022-11-09 NOTE — PROGRESS NOTES
Chief Complaint   Patient presents with    Follow-up     Tremors: Patient reports tremors has gotten worse. HPI    77-year-old woman following up. I saw her initially for tremor and degenerative disc disease. Last visit we decided to pursue a course of home PT which she did and there was some improvement. She told me she was able to go for a walk for the first time in a long time. As she s thinks she may have trimmed her toenail too much might have a slight infection making it difficult to go on walks now. Tremor is still there and today she feels worse in general.  No drooling. No hallucinations. No constipation. No voice changes. Chronic daily pain and neuropathy. Uses a walker. Background:  Ms. Pitts is a 78-year-old woman with a history of diabetes, severe lumbosacral spine disease, high cholesterol, high blood pressure here for shaking and tremor. I spoke with her personally as well as her spouse in the room. I reviewed the medical record. I reviewed neuroimaging completed of the spine in the past.  She is here because in the past 2 years she has had a tremor that in the past 8 months has worsened affecting her arms legs whole body sometimes her head. Tremor can fluctuate in severity and sometimes can be very mild. In the last 2 weeks the tremor is worse in the setting of an exacerbation of her low back pain. She has a history of severe spinal stenosis prompting surgery. Since her surgery in 2019 she has had difficulty walking independently and uses a walker. During Covid, she also became extremely isolated with increasing debility due to lack of physical activity. She denies any hallucinations or constipation or drooling. No falls. Handwriting is affected from her tremor. Sometimes she acts out in her sleep. She has a history of Ménière's. She has depression long-term getting worse due to debility.   She also is very fearful of falling feeling like she is imbalanced. Review of Systems   Musculoskeletal:  Positive for back pain and myalgias. Neurological:  Positive for tremors. Psychiatric/Behavioral:  Positive for depression. Negative for suicidal ideas. The patient is nervous/anxious. Past Medical History:   Diagnosis Date    Chronic pain     back/steroid injections    Diabetes ([address]     GERD (gastroesophageal reflux disease)     hiatal hernia    Hypertension     Menopause     Psychiatric disorder     anxiety/depression     Family History   Problem Relation Age of Onset    Heart Disease Mother         CHF    Alcohol abuse Father     Hypertension Father     Diabetes Father     Cancer Sister         breast    Breast Cancer Sister 79     Social History     Socioeconomic History    Marital status:      Spouse name: Not on file    Number of children: Not on file    Years of education: Not on file    Highest education level: Not on file   Occupational History    Not on file   Tobacco Use    Smoking status: Never    Smokeless tobacco: Never   Vaping Use    Vaping Use: Never used   Substance and Sexual Activity    Alcohol use: Never    Drug use: Never    Sexual activity: Not on file   Other Topics Concern    Not on file   Social History Narrative    Not on file     Social Determinants of Health     Financial Resource Strain: Not on file   Food Insecurity: Not on file   Transportation Needs: Not on file   Physical Activity: Not on file   Stress: Not on file   Social Connections: Not on file   Intimate Partner Violence: Not on file   Housing Stability: Not on file     No Known Allergies      Current Outpatient Medications   Medication Sig    propranolol LA (INDERAL LA) 60 mg SR capsule Take 1 Capsule by mouth daily. pregabalin (LYRICA) 50 mg capsule     metFORMIN (GLUCOPHAGE) 500 mg tablet Take  by mouth two (2) times daily (with meals). citalopram (CELEXA) 40 mg tablet Take 40 mg by mouth every evening.     lisinopril-hydroCHLOROthiazide (PRINZIDE, ZESTORETIC) 10-12.5 mg per tablet Take 1 Tab by mouth daily. atorvastatin (LIPITOR) 20 mg tablet Take 20 mg by mouth daily. omeprazole (PRILOSEC OTC) 20 mg tablet Take 20 mg by mouth as needed. No current facility-administered medications for this visit. Neurologic Exam     Mental Status   WD/WN adult in NAD, normal grooming  VSS  A&O x 3    PERRL, nonicteric  Face is covered  Speech is clear loud  No limb ataxia. Intermittent tremor of the head and voice sometimes in the left arm or right arm at times spontaneously abating and sometimes seems resting. Moving all extemities spontaneously and symmetric  Wide gait with walker, nonshuffling           Visit Vitals  /70 (BP 1 Location: Left upper arm, BP Patient Position: Sitting)   Pulse 95   Resp 16   Ht 5' 1\" (1.549 m)   Wt 196 lb (88.9 kg)   SpO2 96%   BMI 37.03 kg/m²       Assessment and Plan   Diagnoses and all orders for this visit:    1. Tremor, essential    2. DDD (degenerative disc disease), lumbar    3. Physical debility    Other orders  -     propranolol LA (INDERAL LA) 60 mg SR capsule; Take 1 Capsule by mouth daily. 53-year-old woman who has tremor and pain and overall weakness secondary to debility and other factors. Her case is not clear. I do suspect she has worsening essential tremor given the head and body involvement. However today I am considering that there is a little bit more of a resting component and bradykinesia. She is not reporting the other typical signs and symptoms. We decided to pursue propranolol today to try daily and I will see her next month and if things continue to progress as they are we may challenge her with some Sinemet. She agrees. Sometimes various neurological conditions take time to pronounce themselves and she would like to do this conservatively before trying multiple medications.   30 minutes of time was spent in total today reviewing the medical record today,  face-to-face time discussing symptoms and plans, and time completing documentation. I reviewed and decided to continue the current medications. This clinical note was dictated with an electronic dictation software that can make unintentional errors. If there are any questions, please contact me directly for clarification.       2 [facility], [facility] [address]  Diplomate ABPN

## 2022-11-09 NOTE — PROGRESS NOTES
Chief Complaint   Patient presents with    Follow-up     Tremors: Patient reports tremors has gotten worse.       Visit Vitals  /70 (BP 1 Location: Left upper arm, BP Patient Position: Sitting)   Pulse 95   Resp 16   Ht 5' 1\" (1.549 m)   Wt 196 lb (88.9 kg)   SpO2 96%   BMI 37.03 kg/m²

## 2022-12-22 ENCOUNTER — OFFICE VISIT (OUTPATIENT)
Dept: NEUROLOGY | Age: 77
End: 2022-12-22
Payer: MEDICARE

## 2022-12-22 VITALS
HEIGHT: 61 IN | OXYGEN SATURATION: 97 % | RESPIRATION RATE: 16 BRPM | HEART RATE: 80 BPM | BODY MASS INDEX: 37 KG/M2 | DIASTOLIC BLOOD PRESSURE: 64 MMHG | SYSTOLIC BLOOD PRESSURE: 120 MMHG | WEIGHT: 196 LBS

## 2022-12-22 DIAGNOSIS — G25.0 TREMOR, ESSENTIAL: Primary | ICD-10-CM

## 2022-12-22 DIAGNOSIS — G20 PRIMARY PARKINSONISM (HCC): ICD-10-CM

## 2022-12-22 RX ORDER — CARBIDOPA AND LEVODOPA 10; 100 MG/1; MG/1
1 TABLET, ORALLY DISINTEGRATING ORAL 3 TIMES DAILY
Qty: 270 TABLET | Refills: 0 | Status: SHIPPED | OUTPATIENT
Start: 2022-12-22

## 2022-12-22 RX ORDER — MUPIROCIN 20 MG/G
OINTMENT TOPICAL
COMMUNITY
Start: 2022-12-07

## 2022-12-22 NOTE — PROGRESS NOTES
Chief Complaint   Patient presents with    Follow-up     Tremors: Patient reports tremors has been the same since the last follow up. HPI    Mrs Maria G Marr is a 76 year old female here for follow up. She was last seen in office by Dr Stephan Hudson on 11/9/2022 for tremor. She has been taking propranolol daily for her symptoms. She is here today with her wife. She is reporting that her symptoms are slightly better. She is having good days and bad days. Her bad days consists of difficultly getting up and walking, slower movements and pain. She is reporting pain in her foot (toes) which she is seeing podiatry for which is limiting her walking. She uses a cane in the house and gets around ok. No falls. Denies drooling, hallucinations, and constipation. Wife is telling me that she notices that she is \"slower\" and her speech seems to be softner and harder to understand. She is acting out in her sleep and having a hard time falling asleep. When she does fall asleep she says she has vivid dreams. She tells me she is not getting outside these days. More anxious about falling. Background:  Ms. Maria G Marr is a 64-year-old woman with a history of diabetes, severe lumbosacral spine disease, high cholesterol, high blood pressure here for shaking and tremor. I spoke with her personally as well as her spouse in the room. I reviewed the medical record. I reviewed neuroimaging completed of the spine in the past.  She is here because in the past 2 years she has had a tremor that in the past 8 months has worsened affecting her arms legs whole body sometimes her head. Tremor can fluctuate in severity and sometimes can be very mild. In the last 2 weeks the tremor is worse in the setting of an exacerbation of her low back pain. She has a history of severe spinal stenosis prompting surgery. Since her surgery in 2019 she has had difficulty walking independently and uses a walker.   During Covid, she also became extremely isolated with increasing debility due to lack of physical activity. She denies any hallucinations or constipation or drooling. No falls. Handwriting is affected from her tremor. Sometimes she acts out in her sleep. She has a history of Ménière's. She has depression long-term getting worse due to debility. She also is very fearful of falling feeling like she is imbalanced. Review of Systems   Musculoskeletal:  Positive for back pain and myalgias. Neurological:  Positive for tremors. Psychiatric/Behavioral:  Positive for depression. Negative for suicidal ideas. The patient is nervous/anxious.       Past Medical History:   Diagnosis Date    Chronic pain     back/steroid injections    Diabetes ([address]     GERD (gastroesophageal reflux disease)     hiatal hernia    Hypertension     Menopause     Psychiatric disorder     anxiety/depression     Family History   Problem Relation Age of Onset    Heart Disease Mother         CHF    Alcohol abuse Father     Hypertension Father     Diabetes Father     Cancer Sister         breast    Breast Cancer Sister 79     Social History     Socioeconomic History    Marital status:      Spouse name: Not on file    Number of children: Not on file    Years of education: Not on file    Highest education level: Not on file   Occupational History    Not on file   Tobacco Use    Smoking status: Never    Smokeless tobacco: Never   Vaping Use    Vaping Use: Never used   Substance and Sexual Activity    Alcohol use: Never    Drug use: Never    Sexual activity: Not on file   Other Topics Concern    Not on file   Social History Narrative    Not on file     Social Determinants of Health     Financial Resource Strain: Not on file   Food Insecurity: Not on file   Transportation Needs: Not on file   Physical Activity: Not on file   Stress: Not on file   Social Connections: Not on file   Intimate Partner Violence: Not on file   Housing Stability: Not on file     Allergies   Allergen Reactions Other Medication Other (comments)     Wellbutrin-Patient's reports not working well. Current Outpatient Medications   Medication Sig    mupirocin (BACTROBAN) 2 % ointment     carbidopa-levodopa (PARCOPA)  mg rapid dissolve tablet Take 1 Tablet by mouth three (3) times daily. Without food    propranolol LA (INDERAL LA) 60 mg SR capsule Take 1 Capsule by mouth daily. pregabalin (LYRICA) 50 mg capsule     metFORMIN (GLUCOPHAGE) 500 mg tablet Take  by mouth two (2) times daily (with meals). citalopram (CELEXA) 40 mg tablet Take 40 mg by mouth every evening. lisinopril-hydroCHLOROthiazide (PRINZIDE, ZESTORETIC) 10-12.5 mg per tablet Take 1 Tab by mouth daily. atorvastatin (LIPITOR) 20 mg tablet Take 20 mg by mouth daily. omeprazole (PRILOSEC OTC) 20 mg tablet Take 20 mg by mouth as needed. No current facility-administered medications for this visit. Neurologic Exam     Mental Status   WD/WN adult in NAD, normal grooming  VSS  A&O x 3    PERRL, nonicteric  Face is covered  Speech is clear, soft  No limb ataxia. Intermittent tremor of the head and voice sometimes in the left arm or right arm at times spontaneously abating and sometimes seems resting. Moving all extemities spontaneously and symmetric  Wide gait with walker, nonshuffling           Visit Vitals  /64 (BP 1 Location: Left upper arm, BP Patient Position: Sitting)   Pulse 80   Resp 16   Ht 5' 1\" (1.549 m)   Wt 196 lb (88.9 kg)   SpO2 97%   BMI 37.03 kg/m²       Assessment and Plan   Diagnoses and all orders for this visit:    1. Tremor, essential    2. Primary parkinsonism ([facility] [address]  -     REFERRAL TO HOME HEALTH  -     carbidopa-levodopa (PARCOPA)  mg rapid dissolve tablet; Take 1 Tablet by mouth three (3) times daily. Without food  -     REFERRAL TO NEUROLOGY       76 year old women with tremor and weakness.  She is having essential tremor giving her body movements but I questions some of the parkinsonism things that I am seeing and hearing on exam today. She seems a bit more slow today and tells me she feels like she is slower also. We talked in detail about retrying home health to help get her moving more and giving her more confidence. I have ordered home health PT/OT/SLP. She is better with the propranolol so I will continue with this medication. I would like her to start a trail of carbidopa/levodopa  TID to see if this helps with her movement as well. I will start low dose and titrate as needed based on results. We talked about side effects and when to take her meds. She agrees and understands. I will also place a referral to Dr. Kayleen Gonzalez for follow up next year but I would like to see her back in the next few months to check on her status and check on medication management.        FEDERICO Montano

## 2022-12-22 NOTE — PROGRESS NOTES
Neurology staff:    I examined the patient in exam room. I discussed the case and agree with the plans and documentation from [facility] GIBBSNICOLE ADAMS. Ms. Jazz Logan is a 23-year-old woman I have been seeing long-term for essential tremor but I have had some suspicion for Parkinson's. Last visit I added propranolol which she thinks is helping and her spouse thinks is helping also but her spouse is telling me that she is having a lot of sleep disturbances which are not new. She acts out in her sleep. She has vivid dreams. She is afraid to do any walking because she may fall. She is very depressed and sedentary at home. Lots of anxiety. She does not go to bed until about 1 AM.  On exam, she seated in her chair. Follows commands. Awake and alert. Exam shows a very masklike face that is covered. Her speech is soft but clear. On gross examination her left hand has a resting tremor low amplitude moderate frequency. She is a little bit bradykinetic but not severe. Gait nonshuff with walker. 23-year-old woman who presented with tremor suspicious for essential tremor however after seeing her various times now I have high consideration for Parkinson's pronouncing itself more clearly. I am going to give her a trial of low-dose carbidopa the minimal dose possible. I explained to her that this is not a cure nor do I expect dramatic improvement but is worthy of trying and if there is some improvement then we may have our diagnosis. I am also going to have her do a course of home PT OT and speech. We talked about her depression at length. She has been on citalopram now many years. It might be reasonable to change that to something else. She is going to follow-up with us in a couple months. I am also going to place a referral to Dr. Mele Major who is a movement disorder specialist in the community.   45 minutes of time in total was spent reviewing the medical record today, extensive time with the patient and her spouse explaining our plan and reviewing symptoms and time completing documentation today.   [address] [facility],   Neurologist  Diplomate, American Board of Psychiatry and Neurology  Board Certified, Adult Neurology and Brain Injury Medicine

## 2022-12-22 NOTE — PROGRESS NOTES
Chief Complaint   Patient presents with    Follow-up     Tremors: Patient reports tremors has been the same since the last follow up.       Visit Vitals  /64 (BP 1 Location: Left upper arm, BP Patient Position: Sitting)   Pulse 80   Resp 16   Ht 5' 1\" (1.549 m)   Wt 196 lb (88.9 kg)   SpO2 97%   BMI 37.03 kg/m²

## 2023-02-21 NOTE — PROGRESS NOTES
Chief Complaint   Patient presents with    Follow-up     Patient reports tremors throughout whole body       HPI    Mrs [place] is a 76year old female here for follow up. I saw her alongside Dr Neville Nails on 12/22/22 for tremors. At last visit there were thoughts and findings consistent with some parkinsonism things on exam. We started a trial of carbidopa/levodopa  TID to see if this helps with her movement as well. She was continued on propanolol  too. We referred to home health and also Dr Shira Mishra for follow up. She is here today with her partner. They are telling me that [facility] neuro and home health never called her to set up a consult appointment. She is reporting that she doesn't notice a difference with the new medication, although she is not taking it TID. She is reporting that it has not gotten worse. She remembers the morning dose but may forget the evening dose and never remembers the afternoon dose. She is very apprehensive about us reordering home health for her. BACKGROUND  Ms. Froilan Muñoz is a 68-year-old woman with a history of diabetes, severe lumbosacral spine disease, high cholesterol, high blood pressure here for shaking and tremor. I spoke with her personally as well as her spouse in the room. I reviewed the medical record. I reviewed neuroimaging completed of the spine in the past.  She is here because in the past 2 years she has had a tremor that in the past 8 months has worsened affecting her arms legs whole body sometimes her head. Tremor can fluctuate in severity and sometimes can be very mild. In the last 2 weeks the tremor is worse in the setting of an exacerbation of her low back pain. She has a history of severe spinal stenosis prompting surgery. Since her surgery in 2019 she has had difficulty walking independently and uses a walker. During Covid, she also became extremely isolated with increasing debility due to lack of physical activity.   She denies any hallucinations or constipation or drooling. No falls. Handwriting is affected from her tremor. Sometimes she acts out in her sleep. She has a history of Ménière's. She has depression long-term getting worse due to debility. She also is very fearful of falling feeling like she is imbalanced. Review of Systems   Musculoskeletal:  Positive for myalgias. Negative for falls. Neurological:  Positive for tremors and weakness. Past Medical History:   Diagnosis Date    Chronic pain     back/steroid injections    Diabetes ([address]     GERD (gastroesophageal reflux disease)     hiatal hernia    Hypertension     Menopause     Psychiatric disorder     anxiety/depression     Family History   Problem Relation Age of Onset    Heart Disease Mother         CHF    Alcohol abuse Father     Hypertension Father     Diabetes Father     Cancer Sister         breast    Breast Cancer Sister 79     Social History     Socioeconomic History    Marital status:      Spouse name: Not on file    Number of children: Not on file    Years of education: Not on file    Highest education level: Not on file   Occupational History    Not on file   Tobacco Use    Smoking status: Never    Smokeless tobacco: Never   Vaping Use    Vaping Use: Never used   Substance and Sexual Activity    Alcohol use: Never    Drug use: Never    Sexual activity: Not on file   Other Topics Concern    Not on file   Social History Narrative    Not on file     Social Determinants of Health     Financial Resource Strain: Not on file   Food Insecurity: Not on file   Transportation Needs: Not on file   Physical Activity: Not on file   Stress: Not on file   Social Connections: Not on file   Intimate Partner Violence: Not on file   Housing Stability: Not on file     Allergies   Allergen Reactions    Other Medication Other (comments)     Wellbutrin-Patient's reports not working well.          Current Outpatient Medications   Medication Sig    propranolol LA (INDERAL LA) 60 mg SR capsule Take 1 Capsule by mouth daily. mupirocin (BACTROBAN) 2 % ointment     carbidopa-levodopa (PARCOPA)  mg rapid dissolve tablet Take 1 Tablet by mouth three (3) times daily. Without food    pregabalin (LYRICA) 50 mg capsule     metFORMIN (GLUCOPHAGE) 500 mg tablet Take  by mouth two (2) times daily (with meals). citalopram (CELEXA) 40 mg tablet Take 40 mg by mouth every evening. lisinopril-hydroCHLOROthiazide (PRINZIDE, ZESTORETIC) 10-12.5 mg per tablet Take 1 Tab by mouth daily. atorvastatin (LIPITOR) 20 mg tablet Take 20 mg by mouth daily. omeprazole (PRILOSEC OTC) 20 mg tablet Take 20 mg by mouth as needed. No current facility-administered medications for this visit. Neurologic Exam     Mental Status   Oriented to person, place, and time. Speech: speech is normal   Level of consciousness: alert  Flat affect     Cranial Nerves   Cranial nerves II through XII intact. Motor Exam     Strength   Strength 5/5 throughout. Action Tremor in bilateral hands     Gait, Coordination, and Reflexes     Gait  Gait: normalUsing a walker     Visit Vitals  /68 (BP 1 Location: Left arm, BP Patient Position: Sitting, BP Cuff Size: Large adult)   Pulse 70   Resp 16   Ht 5' 1\" (1.549 m)   Wt 198 lb (89.8 kg)   SpO2 97%   BMI 37.41 kg/m²         CT Results (maximum last 3): MRI Results (maximum last 3): Results from [place] encounter on 07/08/21    MRI LUMB SPINE W WO CONT    Narrative  EXAM: MRI LUMB SPINE W WO CONT    INDICATION: . Other intervertebral disc degeneration, lumbar region    COMPARISON: MRI of 6/19/2019, CT of 9/30/2019    TECHNIQUE: MR imaging of the lumbar spine was performed using the following  sequences: sagittal T1, T2, STIR;  axial T1, T2 prior to and following contrast  administration. CONTRAST: 19 mL of ProHance. FINDINGS:    There is normal alignment of the lumbar spine. Vertebral body heights are  maintained.  There are Modic type I acute reactive changes in the endplates at  R4-6 centrally and on the left. Marrow signal is otherwise unremarkable. Mild  localized compression of the anterior superior aspect of L4 is identified but  not as clearly seen as on previous studies. Postsurgical changes are present at  L3-4 and L2-3, with anterior fusion as well as placement of rods and pedicle  screws. The conus medullaris terminates at L1. Signal and caliber of the distal spinal  cord are within normal limits. There is no pathologic intrathecal enhancement. The paraspinal soft tissues are within normal limits. Lower thoracic spine: No herniation or stenosis. L1-L2: Minimal posterior disc bulge. No central or foraminal stenosis. L2-L3: Prior anterior fusion and posterior decompression. Mild central and  bilateral foraminal stenosis, much improved since the preoperative study. L3-L4: Prior anterior fusion and posterior decompression. No central or  foraminal stenosis. L4-L5: Mild bulging disc. Moderate bilateral facet hypertrophy. No central  spinal stenosis. Unchanged moderate left and mild right foraminal stenosis. L5-S1: Disc degeneration with loss of disc height. Severe bilateral facet  hypertrophy. No central spinal stenosis. Moderate to severe right foraminal  stenosis with progression since the previous study. Moderate left foraminal  stenosis. Impression  1. Status post surgical decompression at L2-3 and L3-4 since 2019.  2. L5-S1 degenerative changes with moderate to severe right foraminal stenosis  which has progressed since the preoperative study of 2019. 3. No change in milder degenerative changes at L1-2 and L4-5. Results from [place] encounter on 06/19/19    MRI LUMB SPINE WO CONT    Narrative  EXAM:  MRI LUMB SPINE WO CONT    INDICATION:   Pseudoclaudication syndrome    COMPARISON: None. TECHNIQUE: Multiplanar multisequence acquisition without contrast of the lumbar  spine.     CONTRAST: None.    FINDINGS:  The last well-formed disk is designated as L5-S1 for the purpose of this report. Vertebral bodies were numbered using this convention. Mild retrolisthesis of L2 on L3. Alignment is otherwise within normal limits. No  evidence of acute fracture. Chronic anterior compression fracture of the L4  vertebral body with approximately 20% height loss and no retropulsion. Degenerative endplate marrow changes noted at L2-L3. Marrow signal is otherwise  heterogeneous, but without suspicious focal osseous lesion. Multilevel  degenerative disc disease and facet arthropathy as detailed below, most  significant at L3-L4. The conus is normal in size and signal and terminates at  L1-L2. The cauda equina is unremarkable, other than redundancy due to spinal  canal stenoses. Multiple simple hepatic cysts are noted, largest measuring 9.2  cm. T12-L1: No significant disc herniation, spinal canal or neural foraminal  stenosis. L1-L2: No significant disc herniation, spinal canal or neural foraminal  stenosis. L2-L3: Retrolisthesis. Diffuse disc bulge, eccentric to the right. Mild  bilateral facet arthropathy. Mild spinal canal stenosis. Severe right and no  left neural foraminal stenosis. L3-L4: Diffuse disc bulge, eccentric to the left. Severe right and mild left  facet arthropathy. Severe spinal canal stenosis and severe left lateral recess  stenosis. Mild bilateral neural foraminal stenosis. L4-L5: Mild diffuse disc bulge, eccentric to the left. Severe bilateral facet  arthropathy. No significant spinal canal stenosis. Mild to moderate left and  mild right neural foraminal stenosis. L5-S1: Diffuse disc bulge, eccentric to the left. Severe bilateral facet  arthropathy. No significant spinal canal stenosis. Moderate left and mild to  moderate right neural foraminal stenosis. Impression  IMPRESSION:  1. Severe spinal canal stenosis and severe left lateral recess stenosis at  L3-L4.   2. Mild spinal canal stenosis and severe right neural foraminal stenosis at  L2-L3. Mild retrolisthesis of L2 on L3.  3. Remaining degenerative changes as detailed above. 4. Chronic anterior compression deformity of L4 with mild height loss. 23X        Assessment and Plan   Diagnoses and all orders for this visit:    1. Tremor, essential  -     REFERRAL TO NEUROLOGY  -     propranolol LA (INDERAL LA) 60 mg SR capsule; Take 1 Capsule by mouth daily.  -     REFERRAL TO HOME HEALTH    2. Primary parkinsonism (HCC)  -     REFERRAL TO NEUROLOGY  -     propranolol LA (INDERAL LA) 60 mg SR capsule; Take 1 Capsule by mouth daily.  -     REFERRAL TO HOME HEALTH      76year old female here with primary parkinsonism. She was started on low dose Sinemet but is struggling with remembering to take it TID. She does not notice a difference but it has not gotten worse either. I explained how the medicine works best if taking a few times a day due to it wearing off. Her partner will help her in remembering better, maybe setting a reminder on her phone. I will re-place the referral to Dr Bradley Heard. I think she will benefit from him and his expertise in movement disorder. I also re-ordered the home health. I really think she will benefit from this. It seems like she has a sedentary lifestyle and moving more will really help her feel better. Continue with the propranolol as well. I am hoping she will be able to get in with [facility] for her follow up, otherwise we are here if she needs us. I spent 45 minutes of time today reviewing the medical record and notes, imaging, examining the patient, and face-to-face time, patient/family teaching and medication side effects, and time spent completing documentation.       FEDERICO Martin

## 2023-02-22 ENCOUNTER — OFFICE VISIT (OUTPATIENT)
Dept: NEUROLOGY | Age: 78
End: 2023-02-22
Payer: MEDICARE

## 2023-02-22 VITALS
OXYGEN SATURATION: 97 % | HEIGHT: 61 IN | RESPIRATION RATE: 16 BRPM | WEIGHT: 198 LBS | DIASTOLIC BLOOD PRESSURE: 68 MMHG | SYSTOLIC BLOOD PRESSURE: 127 MMHG | BODY MASS INDEX: 37.38 KG/M2 | HEART RATE: 70 BPM

## 2023-02-22 DIAGNOSIS — G20 PRIMARY PARKINSONISM (HCC): ICD-10-CM

## 2023-02-22 DIAGNOSIS — G25.0 TREMOR, ESSENTIAL: Primary | ICD-10-CM

## 2023-02-22 PROCEDURE — 1123F ACP DISCUSS/DSCN MKR DOCD: CPT

## 2023-02-22 PROCEDURE — G8417 CALC BMI ABV UP PARAM F/U: HCPCS

## 2023-02-22 PROCEDURE — 1090F PRES/ABSN URINE INCON ASSESS: CPT

## 2023-02-22 PROCEDURE — G8399 PT W/DXA RESULTS DOCUMENT: HCPCS

## 2023-02-22 PROCEDURE — G8536 NO DOC ELDER MAL SCRN: HCPCS

## 2023-02-22 PROCEDURE — 99215 OFFICE O/P EST HI 40 MIN: CPT

## 2023-02-22 PROCEDURE — G8432 DEP SCR NOT DOC, RNG: HCPCS

## 2023-02-22 PROCEDURE — 1101F PT FALLS ASSESS-DOCD LE1/YR: CPT

## 2023-02-22 PROCEDURE — G8427 DOCREV CUR MEDS BY ELIG CLIN: HCPCS

## 2023-02-22 RX ORDER — PROPRANOLOL HYDROCHLORIDE 60 MG/1
60 CAPSULE, EXTENDED RELEASE ORAL DAILY
Qty: 90 CAPSULE | Refills: 1 | Status: SHIPPED | OUTPATIENT
Start: 2023-02-22

## 2023-02-22 NOTE — PROGRESS NOTES
Chief Complaint   Patient presents with    Follow-up     Patient reports tremors throughout whole body    Visit Vitals  /68 (BP 1 Location: Left arm, BP Patient Position: Sitting, BP Cuff Size: Large adult)   Pulse 70   Resp 16   Ht 5' 1\" (1.549 m)   Wt 198 lb (89.8 kg)   SpO2 97%   BMI 37.41 kg/m²

## 2023-03-20 DIAGNOSIS — G20 PRIMARY PARKINSONISM (HCC): ICD-10-CM

## 2023-03-20 RX ORDER — CARBIDOPA AND LEVODOPA 10; 100 MG/1; MG/1
TABLET ORAL
Qty: 270 TABLET | Refills: 1 | Status: SHIPPED | OUTPATIENT
Start: 2023-03-20

## 2023-08-16 ENCOUNTER — TRANSCRIBE ORDERS (OUTPATIENT)
Facility: HOSPITAL | Age: 78
End: 2023-08-16

## 2023-08-16 DIAGNOSIS — Z12.31 VISIT FOR SCREENING MAMMOGRAM: Primary | ICD-10-CM

## 2023-08-28 ENCOUNTER — CLINICAL DOCUMENTATION (OUTPATIENT)
Age: 78
End: 2023-08-28

## 2023-08-28 ENCOUNTER — OFFICE VISIT (OUTPATIENT)
Age: 78
End: 2023-08-28
Payer: MEDICARE

## 2023-08-28 VITALS
RESPIRATION RATE: 17 BRPM | HEIGHT: 61 IN | HEART RATE: 72 BPM | DIASTOLIC BLOOD PRESSURE: 78 MMHG | OXYGEN SATURATION: 97 % | SYSTOLIC BLOOD PRESSURE: 123 MMHG | BODY MASS INDEX: 37.38 KG/M2 | WEIGHT: 198 LBS

## 2023-08-28 DIAGNOSIS — G20 PARKINSON'S DISEASE (HCC): ICD-10-CM

## 2023-08-28 DIAGNOSIS — G25.0 ESSENTIAL TREMOR: Primary | ICD-10-CM

## 2023-08-28 PROCEDURE — 99215 OFFICE O/P EST HI 40 MIN: CPT

## 2023-08-28 PROCEDURE — G8427 DOCREV CUR MEDS BY ELIG CLIN: HCPCS

## 2023-08-28 PROCEDURE — 1090F PRES/ABSN URINE INCON ASSESS: CPT

## 2023-08-28 PROCEDURE — G8417 CALC BMI ABV UP PARAM F/U: HCPCS

## 2023-08-28 PROCEDURE — G8399 PT W/DXA RESULTS DOCUMENT: HCPCS

## 2023-08-28 PROCEDURE — 1036F TOBACCO NON-USER: CPT

## 2023-08-28 PROCEDURE — 1123F ACP DISCUSS/DSCN MKR DOCD: CPT

## 2023-08-28 RX ORDER — PROPRANOLOL HCL 60 MG
60 CAPSULE, EXTENDED RELEASE 24HR ORAL DAILY
Qty: 30 CAPSULE | Refills: 5 | Status: SHIPPED | OUTPATIENT
Start: 2023-08-28

## 2023-08-28 RX ORDER — CARBIDOPA AND LEVODOPA 25; 100 MG/1; MG/1
1 TABLET, EXTENDED RELEASE ORAL 2 TIMES DAILY
Qty: 180 TABLET | Refills: 1 | Status: SHIPPED | OUTPATIENT
Start: 2023-08-28

## 2023-08-28 ASSESSMENT — PATIENT HEALTH QUESTIONNAIRE - PHQ9
1. LITTLE INTEREST OR PLEASURE IN DOING THINGS: 0
SUM OF ALL RESPONSES TO PHQ QUESTIONS 1-9: 0
SUM OF ALL RESPONSES TO PHQ QUESTIONS 1-9: 0
2. FEELING DOWN, DEPRESSED OR HOPELESS: 0
SUM OF ALL RESPONSES TO PHQ9 QUESTIONS 1 & 2: 0
SUM OF ALL RESPONSES TO PHQ QUESTIONS 1-9: 0
SUM OF ALL RESPONSES TO PHQ QUESTIONS 1-9: 0

## 2023-08-28 NOTE — PROGRESS NOTES
Chief Complaint   Patient presents with    Follow-up    Tremors      Vitals:    08/28/23 1251   BP: 123/78   Pulse: 72   Resp: 17   SpO2: 97%

## 2023-08-28 NOTE — PROGRESS NOTES
Chief Complaint   Patient presents with    Follow-up    Tremors       HPI    Mrs [facility] is a 76year old female here for follow up. I saw her last on 2/22/23 for tremors/Parkinson's. She was started on carbidopa/levodopa  TID. She was having some compliance difficulties with remembering the afternoon and evening dose. We ordered home health for her. I placed a referral to Dr Wolfgang Kimble to help with her management plan. She is also on Propanolol ER 60 mg daily. She is here today with her partner. She is reporting that she feels she is doing about the same to worse. She has been unable to get in to see Dr Wolfgang Kimble. She still does not want to do any PT/OT. She gets around in the house ok. Denies falls. She reports feeling very wobbly at times. It is hard for her to get going moving in the morning. Her writing is more difficult. She does report some drooling at times. Sleep is not good. Review of Systems   Musculoskeletal:  Positive for gait problem. Neurological:  Positive for tremors and weakness. Psychiatric/Behavioral:  Positive for sleep disturbance.         Past Medical History:   Diagnosis Date    Chronic pain     back/steroid injections    Diabetes ([address])     GERD (gastroesophageal reflux disease)     hiatal hernia    Hypertension     Menopause     Psychiatric disorder     anxiety/depression     Family History   Problem Relation Age of Onset    Breast Cancer Sister 79    Cancer Sister         breast    Diabetes Father     Hypertension Father     Alcohol Abuse Father     Heart Disease Mother         CHF     Social History     Socioeconomic History    Marital status:      Spouse name: Not on file    Number of children: Not on file    Years of education: Not on file    Highest education level: Not on file   Occupational History    Not on file   Tobacco Use    Smoking status: Never    Smokeless tobacco: Never   Vaping Use    Vaping Use: Never used   Substance and Sexual Activity    Alcohol use:

## 2023-09-07 ENCOUNTER — HOSPITAL ENCOUNTER (OUTPATIENT)
Facility: HOSPITAL | Age: 78
Discharge: HOME | End: 2023-09-07
Payer: MEDICARE

## 2023-09-07 VITALS — WEIGHT: 197.97 LBS | BODY MASS INDEX: 37.38 KG/M2 | HEIGHT: 61 IN

## 2023-09-07 DIAGNOSIS — Z12.31 VISIT FOR SCREENING MAMMOGRAM: ICD-10-CM

## 2023-09-07 PROCEDURE — 77063 BREAST TOMOSYNTHESIS BI: CPT

## 2024-01-01 NOTE — PROGRESS NOTES
PT DAILY TREATMENT NOTE - [facility] 2-15    Patient Name: Dorys Situ  Date:2021  : 1945  [x]  Patient  Verified  [place]or: [place] / Plan: [place] MED[place]ARE PART A & B / Product Type: Medicare /    In time:244  Out time:332  Total Treatment Time (min): 48  Total Timed Codes (min): 39  1:1 Treatment Time ([address] only): 44   Visit #:  3    Treatment Area: Low back pain [M54.5]    SUBJECTIVE  Pain Level (0-10 scale): 3/10  Any medication changes, allergies to medications, adverse drug reactions, diagnosis change, or new procedure performed?: [x] No    [] Yes (see summary sheet for update)  Subjective functional status/changes:   [] No changes reported  Patient reports today is not the greatest day for her, she feels sluggish over all. OBJECTIVE    48 min Therapeutic Exercise:  [x] See flow sheet :   Rationale: increase ROM and increase strength to improve the patients ability to perform ADLs and reduce pain levels    With   [] TE   [] TA   [] Neuro   [] SC   [] other: Patient Education: [x] Review HEP    [] Progressed/Changed HEP based on:   [] positioning   [] body mechanics   [] transfers   [] heat/ice application    [] other:      Other Objective/Functional Measures: none noted     Pain Level (0-10 scale) post treatment: 2/10    ASSESSMENT/Changes in Function:   Patient tolerated all therex well, will continue to progress as tolerated. Patient will continue to benefit from skilled PT services to modify and progress therapeutic interventions, address functional mobility deficits, address ROM deficits, address strength deficits, analyze and address soft tissue restrictions, analyze and cue movement patterns, analyze and modify body mechanics/ergonomics and assess and modify postural abnormalities to attain remaining goals. [x]  See Plan of Care  []  See progress note/recertification  []  See Discharge Summary         Progress towards goals / Updated goals:  Patient is progressing towards goals. PLAN  [x]  Upgrade activities as tolerated     [x]  Continue plan of care  [x]  Update interventions per flow sheet       []  Discharge due to:_  []  Other:_      Param Voss PTA 8/18/2021 Statement Selected

## 2024-02-21 DIAGNOSIS — G25.0 ESSENTIAL TREMOR: ICD-10-CM

## 2024-02-21 DIAGNOSIS — G20.A1 PARKINSON'S DISEASE (HCC): ICD-10-CM

## 2024-02-21 RX ORDER — CARBIDOPA AND LEVODOPA 25; 100 MG/1; MG/1
1 TABLET, EXTENDED RELEASE ORAL 2 TIMES DAILY
Qty: 180 TABLET | Refills: 1 | OUTPATIENT
Start: 2024-02-21

## 2024-06-04 NOTE — PROGRESS NOTES
Patient received discharged instructions. Patient was given the opportunity to ask questions regarding discharge instructions. Patient given prescriptions to be filled at the pharmacy of choice. Patient IV removed with IV intact. Patient to be discharged with home health. Render Number Of Lesions Treated: no Detail Level: Detailed Post-Care Instructions: I reviewed with the patient in detail post-care instructions. Patient is to keep the treatment areas dry overnight, and then apply bacitracin twice daily until healed. Patient may apply hydrogen peroxide soaks to remove any crusting. Prep Text (Optional): Prior to removal the treatment areas were prepped in the usual fashion. Acne Type: Comedonal Lesions Consent was obtained and risks were reviewed including but not limited to scarring, infection, bleeding, scabbing, incomplete removal, and allergy to anesthesia. Extraction Method: 11 blade and q-tip

## 2025-01-22 ENCOUNTER — TRANSCRIBE ORDERS (OUTPATIENT)
Facility: HOSPITAL | Age: 80
End: 2025-01-22

## 2025-01-22 DIAGNOSIS — Z12.31 VISIT FOR SCREENING MAMMOGRAM: Primary | ICD-10-CM

## 2025-03-27 ENCOUNTER — HOSPITAL ENCOUNTER (OUTPATIENT)
Facility: HOSPITAL | Age: 80
Discharge: HOME | End: 2025-03-30
Payer: MEDICARE

## 2025-03-27 DIAGNOSIS — Z12.31 VISIT FOR SCREENING MAMMOGRAM: ICD-10-CM

## 2025-03-27 PROCEDURE — 77063 BREAST TOMOSYNTHESIS BI: CPT

## (undated) DEVICE — ADHESIVE SKIN CLOSURE 4X22 CM PREMIERPRO EXOFINFUSION DISP

## (undated) DEVICE — DRAPE,LAP,CHOLE,W/TROUGHS,STERILE: Brand: MEDLINE

## (undated) DEVICE — BIPOLAR FORCEPS CORD: Brand: VALLEYLAB

## (undated) DEVICE — INFECTION CONTROL KIT SYS

## (undated) DEVICE — Device

## (undated) DEVICE — HANDLE LT SNAP ON ULT DURABLE LENS FOR TRUMPF ALC DISPOSABLE

## (undated) DEVICE — SURGIFOAM SPNG SZ 100

## (undated) DEVICE — ELECTRODE BLDE L4IN NONINSULATED EDGE

## (undated) DEVICE — C-ARMOR C-ARM EQUIPMENT COVERS CLEAR STERILE UNIVERSAL FIT 12 PER CASE: Brand: C-ARMOR

## (undated) DEVICE — NDL SPNE QNCKE 18GX3.5IN LF --

## (undated) DEVICE — 3M™ STERI-DRAPE™ INSTRUMENT POUCH 1018: Brand: STERI-DRAPE™

## (undated) DEVICE — STERILE POLYISOPRENE POWDER-FREE SURGICAL GLOVES WITH EMOLLIENT COATING: Brand: PROTEXIS

## (undated) DEVICE — SUTURE STRATAFIX SPRL MCRYL + SZ 2-0 L18IN ABSRB UD CT-1 SXMP1B413

## (undated) DEVICE — STERILE POLYISOPRENE POWDER-FREE SURGICAL GLOVES: Brand: PROTEXIS

## (undated) DEVICE — SOLUTION IV 1000ML 0.9% SOD CHL

## (undated) DEVICE — DRAPE,CHEST,FENES,15X10,STERIL: Brand: MEDLINE

## (undated) DEVICE — FLOSEAL MATRIX IS INDICATED IN SURGICAL PROCEDURES (OTHER THAN IN OPHTHALMIC) AS AN ADJUNCT TO HEMOSTASIS WHEN CONTROL OF BLEEDING BY LIGATURE OR CONVENTIONALPROCEDURES IS INEFFECTIVE OR IMPRACTICAL.: Brand: FLOSEAL HEMOSTATIC MATRIX

## (undated) DEVICE — DRAPE MON DISP FOR EXCELSIUSGPS ROBOTIC NAVIGATION PLATFRM

## (undated) DEVICE — CANISTER, RIGID, 3000CC: Brand: MEDLINE INDUSTRIES, INC.

## (undated) DEVICE — GOWN,SIRUS,NONRNF,SETINSLV,2XL,18/CS: Brand: MEDLINE

## (undated) DEVICE — GARMENT,MEDLINE,DVT,INT,CALF,MED, GEN2: Brand: MEDLINE

## (undated) DEVICE — Z CONVERTED USE 2107985 COVER FLROSCP W36XL28IN 4 SIDE ADH

## (undated) DEVICE — SYR 50ML LR LCK 1ML GRAD NSAF --

## (undated) DEVICE — SUTURE VCRL SZ 1 L18IN ABSRB VLT CT-1 L36MM 1/2 CIR J741D

## (undated) DEVICE — DRAPE C ARM DISP FOR EXCELSIUSGPS ROBOTIC NAVIGATION PLATFRM

## (undated) DEVICE — DISSECTOR LAP DIA5MM BLNT TIP ENDOPATH

## (undated) DEVICE — SUTURE V-LOC 180 SZ 0 L12IN ABSRB GRN L37MM GS-21 1/2 CIR VLOCL0316

## (undated) DEVICE — SUTURE VCRL SZ 2-0 L18IN ABSRB UD CT-1 L36MM 1/2 CIR J839D

## (undated) DEVICE — PICO 7 10CM X 20CM: Brand: PICO™ 7

## (undated) DEVICE — SPHERE STEALTH 12PK/TY --

## (undated) DEVICE — TOOL 14MH30 LEGEND 14CM 3MM: Brand: MIDAS REX ™

## (undated) DEVICE — KIT JACK TBL PT CARE

## (undated) DEVICE — SOLUTION IRRIG 1000ML H2O STRL BLT

## (undated) DEVICE — SPONGE GZ W4XL4IN COT 12 PLY TYP VII WVN C FLD DSGN

## (undated) DEVICE — TUBING, SUCTION, 1/4" X 10', STRAIGHT: Brand: MEDLINE

## (undated) DEVICE — BNDG ADH FABRIC 2X4IN ST LF --

## (undated) DEVICE — NEEDLE HYPO 22GA L1.5IN BLK S STL HUB POLYPR SHLD REG BVL

## (undated) DEVICE — TOTAL TRAY, 16FR 10ML SIL FOLEY, URN: Brand: MEDLINE

## (undated) DEVICE — LAMINECTOMY RICHMOND-LF: Brand: MEDLINE INDUSTRIES, INC.

## (undated) DEVICE — COVER,TABLE,60X90,STERILE: Brand: MEDLINE

## (undated) DEVICE — BONE MARROW KIT ASPIR 11 GA

## (undated) DEVICE — STRAP,POSITIONING,KNEE/BODY,FOAM,4X60": Brand: MEDLINE

## (undated) DEVICE — TUBING IRRIG L77IN DIA0.241IN L BOR FOR CYSTO W/ NVENT

## (undated) DEVICE — BONE WAX WHITE: Brand: BONE WAX WHITE

## (undated) DEVICE — (D)PREP SKN CHLRAPRP APPL 26ML -- CONVERT TO ITEM 371833

## (undated) DEVICE — MARS 3VL DISPOSABLE KIT